# Patient Record
Sex: MALE | Race: ASIAN | Employment: FULL TIME | URBAN - METROPOLITAN AREA
[De-identification: names, ages, dates, MRNs, and addresses within clinical notes are randomized per-mention and may not be internally consistent; named-entity substitution may affect disease eponyms.]

---

## 2018-06-15 ENCOUNTER — NON-PROVIDER VISIT (OUTPATIENT)
Dept: URGENT CARE | Facility: CLINIC | Age: 43
End: 2018-06-15

## 2018-06-15 DIAGNOSIS — Z02.1 PRE-EMPLOYMENT DRUG SCREENING: ICD-10-CM

## 2018-06-15 LAB
AMP AMPHETAMINE: NORMAL
COC COCAINE: NORMAL
INT CON NEG: NORMAL
INT CON POS: NORMAL
MET METHAMPHETAMINES: NORMAL
OPI OPIATES: NORMAL
PCP PHENCYCLIDINE: NORMAL
POC DRUG COMMENT 753798-OCCUPATIONAL HEALTH: NEGATIVE
THC: NORMAL

## 2018-06-15 PROCEDURE — 80305 DRUG TEST PRSMV DIR OPT OBS: CPT | Performed by: NURSE PRACTITIONER

## 2022-12-13 ENCOUNTER — TELEPHONE (OUTPATIENT)
Dept: SCHEDULING | Facility: IMAGING CENTER | Age: 47
End: 2022-12-13

## 2022-12-18 SDOH — ECONOMIC STABILITY: INCOME INSECURITY: IN THE LAST 12 MONTHS, WAS THERE A TIME WHEN YOU WERE NOT ABLE TO PAY THE MORTGAGE OR RENT ON TIME?: NO

## 2022-12-18 SDOH — ECONOMIC STABILITY: FOOD INSECURITY: WITHIN THE PAST 12 MONTHS, YOU WORRIED THAT YOUR FOOD WOULD RUN OUT BEFORE YOU GOT MONEY TO BUY MORE.: NEVER TRUE

## 2022-12-18 SDOH — ECONOMIC STABILITY: TRANSPORTATION INSECURITY
IN THE PAST 12 MONTHS, HAS LACK OF RELIABLE TRANSPORTATION KEPT YOU FROM MEDICAL APPOINTMENTS, MEETINGS, WORK OR FROM GETTING THINGS NEEDED FOR DAILY LIVING?: NO

## 2022-12-18 SDOH — ECONOMIC STABILITY: INCOME INSECURITY: HOW HARD IS IT FOR YOU TO PAY FOR THE VERY BASICS LIKE FOOD, HOUSING, MEDICAL CARE, AND HEATING?: NOT VERY HARD

## 2022-12-18 SDOH — ECONOMIC STABILITY: FOOD INSECURITY: WITHIN THE PAST 12 MONTHS, THE FOOD YOU BOUGHT JUST DIDN'T LAST AND YOU DIDN'T HAVE MONEY TO GET MORE.: NEVER TRUE

## 2022-12-18 SDOH — ECONOMIC STABILITY: HOUSING INSECURITY
IN THE LAST 12 MONTHS, WAS THERE A TIME WHEN YOU DID NOT HAVE A STEADY PLACE TO SLEEP OR SLEPT IN A SHELTER (INCLUDING NOW)?: NO

## 2022-12-18 SDOH — HEALTH STABILITY: PHYSICAL HEALTH

## 2022-12-18 SDOH — ECONOMIC STABILITY: TRANSPORTATION INSECURITY
IN THE PAST 12 MONTHS, HAS THE LACK OF TRANSPORTATION KEPT YOU FROM MEDICAL APPOINTMENTS OR FROM GETTING MEDICATIONS?: NO

## 2022-12-18 SDOH — HEALTH STABILITY: MENTAL HEALTH

## 2022-12-18 SDOH — ECONOMIC STABILITY: TRANSPORTATION INSECURITY
IN THE PAST 12 MONTHS, HAS LACK OF TRANSPORTATION KEPT YOU FROM MEETINGS, WORK, OR FROM GETTING THINGS NEEDED FOR DAILY LIVING?: NO

## 2022-12-18 SDOH — ECONOMIC STABILITY: HOUSING INSECURITY: IN THE LAST 12 MONTHS, HOW MANY PLACES HAVE YOU LIVED?: 2

## 2022-12-18 ASSESSMENT — SOCIAL DETERMINANTS OF HEALTH (SDOH)
HOW OFTEN DO YOU ATTENT MEETINGS OF THE CLUB OR ORGANIZATION YOU BELONG TO?: 1 TO 4 TIMES PER YEAR
HOW OFTEN DO YOU GET TOGETHER WITH FRIENDS OR RELATIVES?: ONCE A WEEK
HOW OFTEN DO YOU GET TOGETHER WITH FRIENDS OR RELATIVES?: ONCE A WEEK
HOW HARD IS IT FOR YOU TO PAY FOR THE VERY BASICS LIKE FOOD, HOUSING, MEDICAL CARE, AND HEATING?: NOT VERY HARD
HOW OFTEN DO YOU HAVE A DRINK CONTAINING ALCOHOL: 4 OR MORE TIMES A WEEK
DO YOU BELONG TO ANY CLUBS OR ORGANIZATIONS SUCH AS CHURCH GROUPS UNIONS, FRATERNAL OR ATHLETIC GROUPS, OR SCHOOL GROUPS?: YES
HOW OFTEN DO YOU HAVE SIX OR MORE DRINKS ON ONE OCCASION: WEEKLY
WITHIN THE PAST 12 MONTHS, YOU WORRIED THAT YOUR FOOD WOULD RUN OUT BEFORE YOU GOT THE MONEY TO BUY MORE: NEVER TRUE
DO YOU BELONG TO ANY CLUBS OR ORGANIZATIONS SUCH AS CHURCH GROUPS UNIONS, FRATERNAL OR ATHLETIC GROUPS, OR SCHOOL GROUPS?: YES
IN A TYPICAL WEEK, HOW MANY TIMES DO YOU TALK ON THE PHONE WITH FAMILY, FRIENDS, OR NEIGHBORS?: MORE THAN THREE TIMES A WEEK
HOW OFTEN DO YOU ATTENT MEETINGS OF THE CLUB OR ORGANIZATION YOU BELONG TO?: 1 TO 4 TIMES PER YEAR
HOW OFTEN DO YOU ATTEND CHURCH OR RELIGIOUS SERVICES?: 1 TO 4 TIMES PER YEAR
HOW OFTEN DO YOU ATTEND CHURCH OR RELIGIOUS SERVICES?: 1 TO 4 TIMES PER YEAR
HOW MANY DRINKS CONTAINING ALCOHOL DO YOU HAVE ON A TYPICAL DAY WHEN YOU ARE DRINKING: 3 OR 4
IN A TYPICAL WEEK, HOW MANY TIMES DO YOU TALK ON THE PHONE WITH FAMILY, FRIENDS, OR NEIGHBORS?: MORE THAN THREE TIMES A WEEK

## 2022-12-18 ASSESSMENT — LIFESTYLE VARIABLES
HOW MANY STANDARD DRINKS CONTAINING ALCOHOL DO YOU HAVE ON A TYPICAL DAY: 3 OR 4
SKIP TO QUESTIONS 9-10: 0
HOW OFTEN DO YOU HAVE SIX OR MORE DRINKS ON ONE OCCASION: WEEKLY
AUDIT-C TOTAL SCORE: 8
HOW OFTEN DO YOU HAVE A DRINK CONTAINING ALCOHOL: 4 OR MORE TIMES A WEEK

## 2022-12-21 ENCOUNTER — OFFICE VISIT (OUTPATIENT)
Dept: MEDICAL GROUP | Facility: PHYSICIAN GROUP | Age: 47
End: 2022-12-21
Payer: COMMERCIAL

## 2022-12-21 VITALS
OXYGEN SATURATION: 98 % | SYSTOLIC BLOOD PRESSURE: 122 MMHG | HEIGHT: 67 IN | RESPIRATION RATE: 18 BRPM | TEMPERATURE: 98.4 F | BODY MASS INDEX: 26.21 KG/M2 | WEIGHT: 167 LBS | DIASTOLIC BLOOD PRESSURE: 68 MMHG | HEART RATE: 107 BPM

## 2022-12-21 DIAGNOSIS — F17.210 NICOTINE DEPENDENCE, CIGARETTES, UNCOMPLICATED: ICD-10-CM

## 2022-12-21 DIAGNOSIS — E78.5 DYSLIPIDEMIA: ICD-10-CM

## 2022-12-21 DIAGNOSIS — E11.9 TYPE 2 DIABETES MELLITUS WITHOUT COMPLICATION, WITHOUT LONG-TERM CURRENT USE OF INSULIN (HCC): ICD-10-CM

## 2022-12-21 DIAGNOSIS — Z12.11 COLON CANCER SCREENING: ICD-10-CM

## 2022-12-21 DIAGNOSIS — R21 RASH AND NONSPECIFIC SKIN ERUPTION: ICD-10-CM

## 2022-12-21 DIAGNOSIS — I10 PRIMARY HYPERTENSION: ICD-10-CM

## 2022-12-21 DIAGNOSIS — Z76.89 ENCOUNTER TO ESTABLISH CARE: ICD-10-CM

## 2022-12-21 DIAGNOSIS — Z23 NEED FOR VACCINATION: ICD-10-CM

## 2022-12-21 DIAGNOSIS — M1A.9XX0 CHRONIC GOUT WITHOUT TOPHUS, UNSPECIFIED CAUSE, UNSPECIFIED SITE: ICD-10-CM

## 2022-12-21 PROBLEM — M10.9 GOUT: Status: ACTIVE | Noted: 2022-12-21

## 2022-12-21 PROCEDURE — 99204 OFFICE O/P NEW MOD 45 MIN: CPT | Mod: 25 | Performed by: STUDENT IN AN ORGANIZED HEALTH CARE EDUCATION/TRAINING PROGRAM

## 2022-12-21 PROCEDURE — 90686 IIV4 VACC NO PRSV 0.5 ML IM: CPT | Performed by: STUDENT IN AN ORGANIZED HEALTH CARE EDUCATION/TRAINING PROGRAM

## 2022-12-21 PROCEDURE — 90471 IMMUNIZATION ADMIN: CPT | Performed by: STUDENT IN AN ORGANIZED HEALTH CARE EDUCATION/TRAINING PROGRAM

## 2022-12-21 PROCEDURE — 99406 BEHAV CHNG SMOKING 3-10 MIN: CPT | Performed by: STUDENT IN AN ORGANIZED HEALTH CARE EDUCATION/TRAINING PROGRAM

## 2022-12-21 RX ORDER — TRIAMCINOLONE ACETONIDE 1 MG/G
1 CREAM TOPICAL 2 TIMES DAILY
Qty: 15 G | Refills: 1 | Status: SHIPPED | OUTPATIENT
Start: 2022-12-21 | End: 2023-05-26

## 2022-12-21 ASSESSMENT — PATIENT HEALTH QUESTIONNAIRE - PHQ9: CLINICAL INTERPRETATION OF PHQ2 SCORE: 0

## 2022-12-21 NOTE — ASSESSMENT & PLAN NOTE
/82, repeat /68 today.  Patient was previously on Lisinopril 20 mg daily and last took medication over 2 years ago.

## 2022-12-21 NOTE — PROGRESS NOTES
Subjective:     CC:  establish care    HISTORY OF THE PRESENT ILLNESS: Patient is a 47 y.o. male here today to establish care.    Gout  History of gout with last flare 5 years ago. Previously taking Indomethacin 50 mg as needed.    Hypertension  /82, repeat /68 today.  Patient was previously on Lisinopril 20 mg daily and last took medication over 2 years ago.    Dyslipidemia  Patient was previously on Atorvastatin 20 mg daily and last took medication over 2 years ago.    Type 2 diabetes mellitus without complication (HCC)  Patient was previously on Metformin 500 mg daily and last took medication over 2 years ago.    Nicotine dependence, cigarettes, uncomplicated  Patient reports he has been smoking since he was in college.  Patient reports currently smoking 0.5 ppd and was smoking less in the past.  Patient is not ready to quit at this time.    Rash and nonspecific skin eruption  Patient complains of an itchy rash on his right ankle that has been present for the past 2 years.  Patient reports he has been applying Vaseline but there has been no improvement and the rash has not changed in size.    Health Maintenance: Completed    Allergies   Allergen Reactions    Farxiga [Dapagliflozin] Hives     Patient Active Problem List   Diagnosis    Type 2 diabetes mellitus without complication (HCC)    Dyslipidemia    Gout    Hypertension     Current Outpatient Medications   Medication Sig Dispense Refill    oxycodone-acetaminophen (PERCOCET) 5-325 MG TABS Take 1 Tab by mouth every four hours as needed for Mild Pain. pain (Patient not taking: Reported on 12/21/2022) 10 Each 0     No current facility-administered medications for this visit.     History reviewed. No pertinent surgical history.   Social History     Socioeconomic History    Marital status:      Spouse name: Not on file    Number of children: 3    Years of education: Not on file    Highest education level: Some college, no degree   Occupational  History    Occupation: HARDWARE PROD SPEC     Employer: [employer]   Tobacco Use    Smoking status: Every Day     Packs/day: 0.50     Years: 25.00     Pack years: 12.50     Types: Cigarettes    Smokeless tobacco: Never   Vaping Use    Vaping Use: Never used   Substance and Sexual Activity    Alcohol use: Not Currently     Comment: 2-3 beers daily    Drug use: Never    Sexual activity: Yes     Partners: Female     Comment:    Other Topics Concern    Not on file   Social History Narrative    Lives with wife and daughter, 2 dogs. 2 sons that have moved out.     Social Determinants of Health     Financial Resource Strain: Low Risk     Difficulty of Paying Living Expenses: Not very hard   Food Insecurity: No Food Insecurity    Worried About Running Out of Food in the Last Year: Never true    Ran Out of Food in the Last Year: Never true   Transportation Needs: No Transportation Needs    Lack of Transportation (Medical): No    Lack of Transportation (Non-Medical): No   Physical Activity: Not on file   Stress: Not on file   Social Connections: Socially Integrated    Frequency of Communication with Friends and Family: More than three times a week    Frequency of Social Gatherings with Friends and Family: Once a week    Attends Religion Services: 1 to 4 times per year    Active Member of Clubs or Organizations: Yes    Attends Club or Organization Meetings: 1 to 4 times per year    Marital Status:    Intimate Partner Violence: Not on file   Housing Stability: Low Risk     Unable to Pay for Housing in the Last Year: No    Number of Places Lived in the Last Year: 2    Unstable Housing in the Last Year: No     Family History   Problem Relation Age of Onset    Diabetes Maternal Grandmother          ROS:     Constitutional:  Negative for chills, fever, fatigue, weight loss.  HEENT:  Negative for blurred vision, hearing loss, sore throat.    Respiratory:  Negative for cough, sputum production  "and shortness of breath.  Cardiovascular:  Negative for chest pain, palpitations and leg swelling.  Gastrointestinal:  Negative for abdominal pain, blood in stool, constipation, diarrhea and vomiting.   Musculoskeletal:  Negative for back pain, falls, joint pain and neck pain.   Skin: Positive for rash.   Neurological:  Negative for dizziness, seizures, weakness and headaches.   Endo/Heme/Allergies:  Does not bruise/bleed easily.   Psychiatric/Behavioral:  Negative for depression, anxiety and suicidal thoughts.      Objective:     Exam: /82   Pulse (!) 107   Temp 36.9 °C (98.4 °F)   Resp 18   Ht 1.702 m (5' 7\")   Wt 75.8 kg (167 lb)   SpO2 98%  Body mass index is 26.16 kg/m².    Gen: Alert and oriented, no acute distress.  Eyes:  PERRL, conjunctivae clear, lids normal.   Neck: Neck is supple, trachea middle, no palpable lymphadenopathy or thyromegaly.  Lungs: Normal effort, CTAB, no wheezing / rhonchi / rales.  CV: RRR, normal S1 and S2, no murmurs.  GI:  Abdomen soft, non-tender, non-distended with normal bowel sounds.  MSK:  Normal ROM.  Ext: No clubbing, cyanosis, or edema.  Skin:  Dry, scaly, hyperpigmented rash on right ankle.  Neuro: AAO x 3, no acute focal deficits.  Psych: Normal affect and mood.      Assessment & Plan:   47 y.o. male with the following -    1. Encounter to establish care  Patient presents today to establish care.  Routine labs ordered.  - CBC WITHOUT DIFFERENTIAL; Future  - Comp Metabolic Panel; Future    2. Type 2 diabetes mellitus without complication, without long-term current use of insulin (HCC)  Chronic.  Patient has been off medication for the past 2 years and was previously on Metformin 500 mg daily.  Dec 2019 A1c 7.8.  Repeat labs ordered and start medication at the next visit.  - HEMOGLOBIN A1C; Future  - MICROALBUMIN CREAT RATIO URINE; Future    3. Dyslipidemia  Chronic.  Patient has been off medication for the past 2 years and was previously on Atorvastatin 20 mg " daily.  Repeat labs ordered and start medication at the next visit.  - Lipid Profile; Future    4. Chronic gout without tophus, unspecified cause, unspecified site  Chronic.  Patient denies recent flare.  Uric acid level ordered.  - URIC ACID; Future    5. Primary hypertension  Chronic.  /82, repeat /68 today.  BP is at goal and I will hold off on medication at this time.  Continue to monitor.    6. Rash and nonspecific skin eruption  Chronic.  Present for the past 2 years.  Prescribed topical triamcinolone.  - triamcinolone acetonide (KENALOG) 0.1 % Cream; Apply 1 Application topically 2 times a day.  Dispense: 15 g; Refill: 1    7. Nicotine dependence, cigarettes, uncomplicated  Chronic.  Time spent counseling: 3 min. Discussed risks associated with smoking but patient is not ready to quit at this time.    8. Colon cancer screening  - COLOGUARD COLON CANCER SCREENING    9. Need for vaccination  - Influenza Vaccine Quad Injection (PF)          Return in about 2 weeks (around 1/4/2023) for Discuss labs.    Please note that this dictation was created using voice recognition software. I have made every reasonable attempt to correct obvious errors, but I expect that there are errors of grammar and possibly content that I did not discover before finalizing the note.

## 2023-01-06 LAB
ALBUMIN SERPL-MCNC: 3.9 G/DL (ref 4–5)
ALBUMIN/CREAT UR: 22 MG/G CREAT (ref 0–29)
ALBUMIN/GLOB SERPL: 1.6 {RATIO} (ref 1.2–2.2)
ALP SERPL-CCNC: 85 IU/L (ref 44–121)
ALT SERPL-CCNC: 46 IU/L (ref 0–44)
AST SERPL-CCNC: 47 IU/L (ref 0–40)
BILIRUB SERPL-MCNC: 0.8 MG/DL (ref 0–1.2)
BUN SERPL-MCNC: 5 MG/DL (ref 6–24)
BUN/CREAT SERPL: 7 (ref 9–20)
CALCIUM SERPL-MCNC: 8.3 MG/DL (ref 8.7–10.2)
CHLORIDE SERPL-SCNC: 97 MMOL/L (ref 96–106)
CHOLEST SERPL-MCNC: 269 MG/DL (ref 100–199)
CO2 SERPL-SCNC: 21 MMOL/L (ref 20–29)
CREAT SERPL-MCNC: 0.7 MG/DL (ref 0.76–1.27)
CREAT UR-MCNC: 101.8 MG/DL
EGFRCR SERPLBLD CKD-EPI 2021: 113 ML/MIN/1.73
ERYTHROCYTE [DISTWIDTH] IN BLOOD BY AUTOMATED COUNT: 15.7 % (ref 11.6–15.4)
GLOBULIN SER CALC-MCNC: 2.5 G/DL (ref 1.5–4.5)
GLUCOSE SERPL-MCNC: 283 MG/DL (ref 70–99)
HBA1C MFR BLD: 11.9 % (ref 4.8–5.6)
HCT VFR BLD AUTO: 42.5 % (ref 37.5–51)
HDLC SERPL-MCNC: 33 MG/DL
HGB BLD-MCNC: 13.6 G/DL (ref 13–17.7)
LABORATORY COMMENT REPORT: ABNORMAL
LDLC SERPL CALC-MCNC: ABNORMAL MG/DL (ref 0–99)
MCH RBC QN AUTO: 25.7 PG (ref 26.6–33)
MCHC RBC AUTO-ENTMCNC: 32 G/DL (ref 31.5–35.7)
MCV RBC AUTO: 80 FL (ref 79–97)
MICROALBUMIN UR-MCNC: 22.5 UG/ML
NRBC BLD AUTO-RTO: ABNORMAL %
PLATELET # BLD AUTO: 143 X10E3/UL (ref 150–450)
POTASSIUM SERPL-SCNC: 3.7 MMOL/L (ref 3.5–5.2)
PROT SERPL-MCNC: 6.4 G/DL (ref 6–8.5)
RBC # BLD AUTO: 5.3 X10E6/UL (ref 4.14–5.8)
SODIUM SERPL-SCNC: 135 MMOL/L (ref 134–144)
TRIGL SERPL-MCNC: 1338 MG/DL (ref 0–149)
URATE SERPL-MCNC: 5.8 MG/DL (ref 3.8–8.4)
VLDLC SERPL CALC-MCNC: ABNORMAL MG/DL (ref 5–40)
WBC # BLD AUTO: 4.5 X10E3/UL (ref 3.4–10.8)

## 2023-01-12 ENCOUNTER — OFFICE VISIT (OUTPATIENT)
Dept: MEDICAL GROUP | Facility: PHYSICIAN GROUP | Age: 48
End: 2023-01-12
Payer: COMMERCIAL

## 2023-01-12 VITALS
HEART RATE: 86 BPM | BODY MASS INDEX: 23.86 KG/M2 | TEMPERATURE: 98.5 F | HEIGHT: 67 IN | SYSTOLIC BLOOD PRESSURE: 120 MMHG | RESPIRATION RATE: 13 BRPM | DIASTOLIC BLOOD PRESSURE: 78 MMHG | WEIGHT: 152 LBS | OXYGEN SATURATION: 99 %

## 2023-01-12 DIAGNOSIS — M1A.9XX0 CHRONIC GOUT WITHOUT TOPHUS, UNSPECIFIED CAUSE, UNSPECIFIED SITE: ICD-10-CM

## 2023-01-12 DIAGNOSIS — R79.89 ELEVATED LFTS: ICD-10-CM

## 2023-01-12 DIAGNOSIS — E78.1 HYPERTRIGLYCERIDEMIA: ICD-10-CM

## 2023-01-12 DIAGNOSIS — E78.5 DYSLIPIDEMIA: ICD-10-CM

## 2023-01-12 DIAGNOSIS — R21 RASH AND NONSPECIFIC SKIN ERUPTION: ICD-10-CM

## 2023-01-12 DIAGNOSIS — E11.65 UNCONTROLLED TYPE 2 DIABETES MELLITUS WITH HYPERGLYCEMIA (HCC): ICD-10-CM

## 2023-01-12 DIAGNOSIS — I10 PRIMARY HYPERTENSION: ICD-10-CM

## 2023-01-12 PROCEDURE — 99214 OFFICE O/P EST MOD 30 MIN: CPT | Performed by: STUDENT IN AN ORGANIZED HEALTH CARE EDUCATION/TRAINING PROGRAM

## 2023-01-12 RX ORDER — ATORVASTATIN CALCIUM 20 MG/1
20 TABLET, FILM COATED ORAL NIGHTLY
Qty: 90 TABLET | Refills: 1 | Status: SHIPPED | OUTPATIENT
Start: 2023-01-12 | End: 2023-04-12 | Stop reason: SDUPTHER

## 2023-01-12 RX ORDER — FENOFIBRATE 145 MG/1
145 TABLET, COATED ORAL DAILY
Qty: 90 TABLET | Refills: 1 | Status: SHIPPED | OUTPATIENT
Start: 2023-01-12 | End: 2023-04-12 | Stop reason: SDUPTHER

## 2023-01-12 ASSESSMENT — FIBROSIS 4 INDEX: FIB4 SCORE: 2.37

## 2023-01-12 ASSESSMENT — PATIENT HEALTH QUESTIONNAIRE - PHQ9: CLINICAL INTERPRETATION OF PHQ2 SCORE: 0

## 2023-01-13 PROBLEM — R79.89 ELEVATED LFTS: Status: ACTIVE | Noted: 2023-01-13

## 2023-01-13 NOTE — ASSESSMENT & PLAN NOTE
Current meds: None (previously on metformin 500 mg daily but last took medication 2 years ago)  Last A1c: 11.9 (1/4/23)  Last Microalbumin/Cr ratio: 22 (1/4/23),   Fasting sugars: N/A  Last diabetic foot exam: will be done at the next visit  Last retinal eye exam: will be done at the next visit

## 2023-01-17 DIAGNOSIS — E11.65 UNCONTROLLED TYPE 2 DIABETES MELLITUS WITH HYPERGLYCEMIA (HCC): ICD-10-CM

## 2023-01-17 RX ORDER — LANCETS 30 GAUGE
EACH MISCELLANEOUS
Qty: 100 EACH | Refills: 1 | Status: SHIPPED | OUTPATIENT
Start: 2023-01-17 | End: 2023-02-06

## 2023-01-31 ENCOUNTER — NON-PROVIDER VISIT (OUTPATIENT)
Dept: MEDICAL GROUP | Facility: PHYSICIAN GROUP | Age: 48
End: 2023-01-31
Payer: COMMERCIAL

## 2023-01-31 PROCEDURE — 99403 PREV MED CNSL INDIV APPRX 45: CPT | Performed by: PHARMACIST

## 2023-01-31 RX ORDER — BLOOD SUGAR DIAGNOSTIC
STRIP MISCELLANEOUS
COMMUNITY
Start: 2023-01-30 | End: 2023-02-06

## 2023-01-31 RX ORDER — DULAGLUTIDE 0.75 MG/.5ML
1 INJECTION, SOLUTION SUBCUTANEOUS
Qty: 1.96 ML | Refills: 0 | Status: SHIPPED | OUTPATIENT
Start: 2023-01-31 | End: 2023-03-01

## 2023-01-31 NOTE — PROGRESS NOTES
Patient Consult Note - Initial Visit    TIME IN: 3:49pm  TIME OUT: 4:29pm    Primary care physician: Shahana Nuñez M.D.    Reason for consult: Management of Uncontrolled Type 2 Diabetes    HPI:  Yoni Jose is a 47 y.o. old patient who comes in today for evaluation of above stated problem.    Most Recent HbA1c:   Lab Results   Component Value Date/Time    HBA1C 11.9 (H) 01/04/2023 08:15 AM    HBA1C 10.2 (H) 08/18/2015 09:17 AM      Lab Results   Component Value Date/Time    CREATININE 0.70 (L) 01/04/2023 08:15 AM    CREATININE 0.92 08/18/2015 09:17 AM              Diabetes Medication History and Current Regimen  Metformin: 500mg QD or BID    Previously attempted medications  Metformin, stopped several years ago    Pt has home glucometer and proper testing technique - Yes, but did not bring log today    Pt reports blood sugars:   Before Breakfast:   Before Lunch:   Before Dinner:   Before Bedtime:   Other times: pt states he has been testing ~30min PP and it has been high 200's to low 300's    Hypoglycemia awareness - Yes  Nocturnal hypoglycemia- None  Hypoglycemia:  None    Pt's treatment of Hypoglycemia - 15:15 Rule    Current Exercise - None  Exercise Goal - Had good exercise habits previously, gave away home gym equipment couple years back.  Discussed need for consistent exercise routine.  Encouraged him to start with couple days per week of walking and slowly progress up to goal of 150 min/week moderate intensity exercise.    Dietary - Admits to cultural influence on diet where rice is incorporated to most meals.  He has begun to cut down on this though, especially in the evening hours.    Pt reports eating:  Breakfast - Whole grain bread with PB, eggs.  Dinner - lean proteins (chicken, fish, pork, etc) along with veggies and minimal or no rice.  Snack - no desserts  Beverages - Diet soda on occasion, otherwise water.  Admits to daily alcohol intake, discussed keeping in moderation or eliminating to  better control diabetes.    Foot Exam:  Monofilament exam - will be done at visit with PCP.    Preventative Management  BP regimen (ACE/ARB) - none  ASA - none  Statin - Atorvastatin 20mg QD  Last Retinal Scan - regular f/u with optometry  Last Foot Exam - unknown  Last A1c -   Lab Results   Component Value Date/Time    HBA1C 11.9 (H) 01/04/2023 08:15 AM    HBA1C 10.2 (H) 08/18/2015 09:17 AM      Last Microalbuminuria - 1/2023     Latest Reference Range & Units 01/04/23 08:15   Creatinine, Random Urine Not Estab. mg/dL 101.8   Microalbumin, Urine Random Not Estab. ug/mL 22.5   Microalbumin-Creatinine 0 - 29 mg/g creat 22       Lipid Panel   Latest Reference Range & Units 01/04/23 08:15   Cholesterol,Tot 100 - 199 mg/dL 269 (H)   Triglycerides 0 - 149 mg/dL 1,338 (HH)   HDL >39 mg/dL 33 (L)   LDL Chol Calc (NIH) 0 - 99 mg/dL Comment !   VLDL Cholesterol Calc 5 - 40 mg/dL Comment !     At risk for pancreatitis, has started statin and fenofibrate since labs done.  Discussed with patient risk of pancreatitis and need to decrease alcohol and carb intake for better control.      Past Medical History:  Patient Active Problem List    Diagnosis Date Noted    Elevated LFTs 01/13/2023    Hypertriglyceridemia 01/12/2023    Uncontrolled diabetes mellitus with hyperglycemia (HCC) 12/21/2022    Dyslipidemia 12/21/2022    Gout 12/21/2022    Hypertension 12/21/2022    Nicotine dependence, cigarettes, uncomplicated 12/21/2022    Rash and nonspecific skin eruption 12/21/2022       Past Surgical History:  No past surgical history on file.    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 2 sons that have moved out.     Social Determinants of Health     Financial Resource Strain: Low Risk     Difficulty of Paying Living Expenses: Not very hard   Food Insecurity: No Food Insecurity    Worried About Running Out of Food in the Last Year: Never true    Ran Out of Food in the Last Year: Never true   Transportation Needs: No Transportation Needs    Lack of Transportation (Medical): No    Lack of Transportation (Non-Medical): No   Physical Activity: Not on file   Stress: Not on file   Social Connections: Socially Integrated    Frequency of Communication with Friends and Family: More than three times a week    Frequency of Social Gatherings with Friends and Family: Once a week    Attends Hinduism Services: 1 to 4 times per year    Active Member of Clubs or Organizations: Yes    Attends Club or Organization Meetings: 1 to 4 times per year    Marital Status:    Intimate Partner Violence: Not on file   Housing Stability: Low Risk     Unable to Pay for Housing in the Last Year: No    Number of Places Lived in the Last Year: 2    Unstable Housing in the Last Year: No       Family History:  Family History   Problem Relation Age of Onset    Diabetes Maternal Grandmother        Medications:    Current Outpatient Medications:     Blood Glucose Test Strips, Use with glucometer to check blood glucose levels 3-4x daily., Disp: 100 Strip, Rfl: 1    Lancets, Use with glucometer to check blood glucose levels 3-4x daily., Disp: 100 Each, Rfl: 1    atorvastatin (LIPITOR) 20 MG  Tab, Take 1 Tablet by mouth every evening., Disp: 90 Tablet, Rfl: 1    fenofibrate (TRICOR) 145 MG Tab, Take 1 Tablet by mouth every day., Disp: 90 Tablet, Rfl: 1    metFORMIN (GLUCOPHAGE) 500 MG Tab, Take 1 Tablet by mouth every day., Disp: 60 Tablet, Rfl: 2    triamcinolone acetonide (KENALOG) 0.1 % Cream, Apply 1 Application topically 2 times a day., Disp: 15 g, Rfl: 1    Labs: Reviewed    Physical Examination:  Vital signs: There were no vitals taken for this visit. There is no height or weight on file to calculate BMI.    Assessment and Plan:    1. DM2  Patient seen today for initial visit, referred by PCP.  Longstanding hx of diabetes, family hx unremarkable.  Had two year gap in medical cover d/t lack of insurance coverage and was off all medications.  Previous A1c's under much better control than most recent results.  Diabetes seems to be largely influenced by dietary habits.    Basic physiology of DMII was explained to patient as well as microvascular/macrovascular complications. The importance of increasing physical activity to improve diabetes control was discussed with the patient. Patient was also educated on changing diet and making better choices to help control blood sugar.  Discussed FBG goal of , 2hr PP <180, and A1c <7.0%.    Discussed current treatment modalities and rationale for use of GLP1a, SGLT2i, and GLP/GIPa.  Patient states that he had allergic reaction to Farxiga in the past, discussed trying Jardiance to determine if class effect, but decided on starting GLP1a today.    - Medication changes -   START Trulicity 0.75mg once weekly.  Continue all other medications for now.     - Lifestyle changes -   Diet:  Continue to minimize rice intake, recognize need to limit alcohol consumption.  Focus on lean proteins, whole foods, and adequate hydration.  Exercise:  As above, begin implementing routine exercise plan.    Follow Up:  Four weeks    Douglas Correia, BravoD, BCACP  01/31/23    CC:    Shahana Nuñez M.D.  Kleber Branch M.D.                                                   [facility] Consent      Name    Yoni Jose    MRN number: [id number]    the following are guidelines for participation in the [facility].     I, ____Yoni Jose_____, understand and voluntarily agree to participate in the [facility] and to have services provided to me by pharmacists working in collaboration with my provider.    I understand the pharmacist within the [facility] may initiate, modify or discontinue my medications, order appropriate testing and appointments, perform exams, monitor treatment, and make clinical evaluations and decisions pursuant to a collaborative practice agreement with my provider.  I understand the pharmacist within the [facility] is not a physician, osteopathic physician, advanced practice registered nurse or physician assistant and may not diagnose.  I will take all my medications as instructed and not change the way I take it without first talking to my provider or a pharmacist within the [facility].  I understand that if I am late to my appointment I may not be able to be seen by a pharmacist at that time and will have to reschedule my appointment.  During appointment with pharmacist I understand that pharmacist has the right not to answer questions or perform services outside the pharmacist’s scope of practice.  By signing below, I provide informed consent for the pharmacist to provide these services and for my participation in the [facility].      Yoni Jose           [id number]          01/31/23  Patient Name                   MRN number  Date     ___X_Obtained verbal consent from pt, No signature due to COVID concerns___   Patient Signature

## 2023-02-06 DIAGNOSIS — E11.65 UNCONTROLLED TYPE 2 DIABETES MELLITUS WITH HYPERGLYCEMIA (HCC): ICD-10-CM

## 2023-02-06 RX ORDER — LANCETS 30 GAUGE
EACH MISCELLANEOUS
Qty: 100 EACH | Refills: 1 | Status: SHIPPED | OUTPATIENT
Start: 2023-02-06

## 2023-02-06 RX ORDER — CALCIUM CITRATE/VITAMIN D3 200MG-6.25
TABLET ORAL
Qty: 100 STRIP | Refills: 1 | Status: SHIPPED | OUTPATIENT
Start: 2023-02-06 | End: 2023-04-03

## 2023-02-06 NOTE — PROGRESS NOTES
[employer] covered by insurance.  Glucometer, test strips, lancets ordered for uncontrolled type 2 DM.

## 2023-03-01 RX ORDER — DULAGLUTIDE 0.75 MG/.5ML
1 INJECTION, SOLUTION SUBCUTANEOUS
Qty: 2 ML | Refills: 1 | Status: SHIPPED | OUTPATIENT
Start: 2023-03-01 | End: 2023-03-07

## 2023-03-07 ENCOUNTER — NON-PROVIDER VISIT (OUTPATIENT)
Dept: MEDICAL GROUP | Facility: PHYSICIAN GROUP | Age: 48
End: 2023-03-07
Payer: COMMERCIAL

## 2023-03-07 PROCEDURE — 99401 PREV MED CNSL INDIV APPRX 15: CPT | Performed by: STUDENT IN AN ORGANIZED HEALTH CARE EDUCATION/TRAINING PROGRAM

## 2023-03-07 RX ORDER — EMPAGLIFLOZIN 10 MG/1
10 TABLET, FILM COATED ORAL DAILY
Qty: 14 TABLET | Refills: 0 | Status: SHIPPED | OUTPATIENT
Start: 2023-03-07 | End: 2023-03-21

## 2023-03-07 RX ORDER — DULAGLUTIDE 1.5 MG/.5ML
1 INJECTION, SOLUTION SUBCUTANEOUS
Qty: 2.24 ML | Refills: 6 | Status: SHIPPED | OUTPATIENT
Start: 2023-03-07 | End: 2023-05-26

## 2023-03-07 NOTE — PROGRESS NOTES
Patient Consult Note - Follow Up Visit  Primary care physician: Shahana Nuñez M.D.    Reason for consult: Management of Uncontrolled Type 2 Diabetes    Time IN:  8:58am  Time OUT:  9:16am    HPI:  Yoni Jose is a 47 y.o. old patient who comes in today for evaluation of above stated problem.    Most Recent HbA1c:   Lab Results   Component Value Date/Time    HBA1C 11.9 (H) 01/04/2023 08:15 AM    HBA1C 10.2 (H) 08/18/2015 09:17 AM        Current Diabetes Regimen:  GLP-1 Agent: Dulaglutide 0.75 mg once weekly   Metformin ER/IR 500mg BID      Before Breakfast:  108-157  Before Lunch:   Before Dinner:    Before Bedtime:  Other times:  Hypoglycemia:  None    Diet/Exercise:  Exercise Goal - Encouraged him to start with couple days per week of walking and slowly progress up to goal of 150 min/week moderate intensity exercise.     Dietary - Admits to cultural influence on diet where rice is incorporated to most meals.  He has begun to cut down on this though, especially in the evening hours.    Foot Exam:  Monofilament exam - will be done at visit with PCP.     Preventative Management  BP regimen (ACE/ARB) - none  ASA - none  Statin - Atorvastatin 20mg QD  Last Retinal Scan - regular f/u with optometry  Last Foot Exam - unknown  Last A1c -         Lab Results   Component Value Date/Time     HBA1C 11.9 (H) 01/04/2023 08:15 AM     HBA1C 10.2 (H) 08/18/2015 09:17 AM      Last Microalbuminuria - 1/2023       Latest Reference Range & Units 01/04/23 08:15   Creatinine, Random Urine Not Estab. mg/dL 101.8   Microalbumin, Urine Random Not Estab. ug/mL 22.5   Microalbumin-Creatinine 0 - 29 mg/g creat 22        Lipid Panel    Latest Reference Range & Units 01/04/23 08:15   Cholesterol,Tot 100 - 199 mg/dL 269 (H)   Triglycerides 0 - 149 mg/dL 1,338 (HH)   HDL >39 mg/dL 33 (L)   LDL Chol Calc ([facility]) 0 - 99 mg/dL Comment !   VLDL Cholesterol Calc 5 - 40 mg/dL Comment !      At risk for pancreatitis, has started  statin and fenofibrate since labs done.  Discussed with patient risk of pancreatitis and need to decrease alcohol and carb intake for better control.       ROS:  Constitutional: No weight loss  Cardiac: No palpitations or racing heart  Resp: No shortness of breath  Neuro: No numbness or tinging in feet  Endo: No heat or cold intolerance, no polyuria or polydipsia  All other systems were reviewed and were negative.    Past Medical History:  Patient Active Problem List    Diagnosis Date Noted    Elevated LFTs 01/13/2023    Hypertriglyceridemia 01/12/2023    Uncontrolled diabetes mellitus with hyperglycemia (HCC) 12/21/2022    Dyslipidemia 12/21/2022    Gout 12/21/2022    Hypertension 12/21/2022    Nicotine dependence, cigarettes, uncomplicated 12/21/2022    Rash and nonspecific skin eruption 12/21/2022       Past Surgical History:  No past surgical history on file.    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 2 sons that have moved out.     Social Determinants of Health     Financial Resource Strain: Low Risk     Difficulty of Paying Living Expenses: Not very hard   Food Insecurity: No Food Insecurity    Worried About Running Out of  Food in the Last Year: Never true    Ran Out of Food in the Last Year: Never true   Transportation Needs: No Transportation Needs    Lack of Transportation (Medical): No    Lack of Transportation (Non-Medical): No   Physical Activity: Not on file   Stress: Not on file   Social Connections: Socially Integrated    Frequency of Communication with Friends and Family: More than three times a week    Frequency of Social Gatherings with Friends and Family: Once a week    Attends Restorationism Services: 1 to 4 times per year    Active Member of Clubs or Organizations: Yes    Attends Club or Organization Meetings: 1 to 4 times per year    Marital Status:    Intimate Partner Violence: Not on file   Housing Stability: Low Risk     Unable to Pay for Housing in the Last Year: No    Number of Places Lived in the Last Year: 2    Unstable Housing in the Last Year: No       Family History:  Family History   Problem Relation Age of Onset    Diabetes Maternal Grandmother        Medications:    Current Outpatient Medications:     Blood Glucose Monitoring Suppl (TRUE METRIX METER) w/Device Kit, Use as directed to check blood glucose levels 3-4 times daily., Disp: 1 Kit, Rfl: 0    glucose blood (TRUE METRIX BLOOD GLUCOSE TEST) strip, Check blood glucose levels 3-4 times daily., Disp: 100 Strip, Rfl: 1    Lancets, Lancets order: Lancets for True Metrix meter. Sig: use 3-4 times daily and as needed for symptoms of high or low sugar., Disp: 100 Each, Rfl: 1    TRULICITY 0.75 MG/0.5ML Solution Pen-injector, INJECT 1 PEN UNDER THE SKIN EVERY 7 DAYS., Disp: 2 mL, Rfl: 1    atorvastatin (LIPITOR) 20 MG Tab, Take 1 Tablet by mouth every evening., Disp: 90 Tablet, Rfl: 1    fenofibrate (TRICOR) 145 MG Tab, Take 1 Tablet by mouth every day., Disp: 90 Tablet, Rfl: 1    metFORMIN (GLUCOPHAGE) 500 MG Tab, Take 1 Tablet by mouth every day., Disp: 60 Tablet, Rfl: 2    triamcinolone acetonide (KENALOG) 0.1 % Cream, Apply 1 Application topically 2  times a day., Disp: 15 g, Rfl: 1    Labs: Reviewed    Physical Examination:  Vital signs: There were no vitals taken for this visit. There is no height or weight on file to calculate BMI.  General: No apparent distress, cooperative  Eyes: No scleral icterus or discharge  ENMT: Normal on external inspection of nose, lips, normal thyroid exam  Neck: No abnormal masses on inspection  Resp: Normal effort, clear to auscultation bilaterally   CVS: Regular rate and rhythm, S1 S2 normal, no murmur   Extremities: No edema  Abdomen: abdominal obesity present  Neuro: Alert and oriented  Skin: No rash  Psych: Normal mood and affect, intact memory and able to make informed decisions    Assessment and Plan:    Patient is on max tolerated dose of metformin.  Tolerating initial dosing of Trulicity.  Home blood glucose have improved some, but still having quite a bit of lability throughout the day and FBG.  Diet and exercise largely unchanged, but has begun to recognize dietary impacts on blood glucose and adjusting habits accordingly.  As per previous note, patient did have reaction to Farxiga in the past, but is open to trial of Jardiance to determine if class effect.    START Jardiance 10mg QD, gave coupon card for 14 day trial.  Instructed to stop ASAP if any severe reaction.  INCREASE Trulicity to 1.5mg SQ once weekly.  Continue all other medications for now.  Continue to be mindful on dietary habits, minimizing carbs and sweets, focus on lean meats and veggies.  Increase exercise.    Follow Up:  Six weeks.    Thank you for allowing me to participate in the care of this patient.    Douglas Correia, BravoD, BCACP  03/07/23    CC:   Shahana Nuñez M.D.

## 2023-03-14 DIAGNOSIS — E11.65 UNCONTROLLED TYPE 2 DIABETES MELLITUS WITH HYPERGLYCEMIA (HCC): ICD-10-CM

## 2023-03-21 RX ORDER — EMPAGLIFLOZIN 25 MG/1
1 TABLET, FILM COATED ORAL DAILY
Qty: 90 TABLET | Refills: 1 | Status: SHIPPED | OUTPATIENT
Start: 2023-03-21 | End: 2023-05-26

## 2023-04-02 DIAGNOSIS — E11.65 UNCONTROLLED TYPE 2 DIABETES MELLITUS WITH HYPERGLYCEMIA (HCC): ICD-10-CM

## 2023-04-03 RX ORDER — CALCIUM CITRATE/VITAMIN D3 200MG-6.25
TABLET ORAL
Qty: 100 STRIP | Refills: 0 | Status: SHIPPED | OUTPATIENT
Start: 2023-04-03 | End: 2023-05-26 | Stop reason: SDUPTHER

## 2023-04-03 NOTE — TELEPHONE ENCOUNTER
Requested Prescriptions     Pending Prescriptions Disp Refills   • TRUE METRIX BLOOD GLUCOSE TEST strip [Pharmacy Med Name: TRUE METRIX GLUCOSE TEST STRIP] 100 Strip 0     Sig: CHECK BLOOD GLUCOSE LEVELS 3-4 TIMES DAILY.       JAVIER Helms.

## 2023-04-12 DIAGNOSIS — E78.1 HYPERTRIGLYCERIDEMIA: ICD-10-CM

## 2023-04-12 DIAGNOSIS — E11.65 UNCONTROLLED TYPE 2 DIABETES MELLITUS WITH HYPERGLYCEMIA (HCC): ICD-10-CM

## 2023-04-12 RX ORDER — FENOFIBRATE 145 MG/1
145 TABLET, COATED ORAL DAILY
Qty: 90 TABLET | Refills: 3 | Status: SHIPPED | OUTPATIENT
Start: 2023-04-12 | End: 2023-07-10

## 2023-04-12 RX ORDER — ATORVASTATIN CALCIUM 20 MG/1
20 TABLET, FILM COATED ORAL NIGHTLY
Qty: 90 TABLET | Refills: 3 | Status: SHIPPED | OUTPATIENT
Start: 2023-04-12 | End: 2023-07-10

## 2023-04-12 RX ORDER — ATORVASTATIN CALCIUM 20 MG/1
20 TABLET, FILM COATED ORAL NIGHTLY
Qty: 90 TABLET | Refills: 0 | OUTPATIENT
Start: 2023-04-12

## 2023-04-12 RX ORDER — FENOFIBRATE 145 MG/1
145 TABLET, COATED ORAL DAILY
Qty: 90 TABLET | Refills: 0 | OUTPATIENT
Start: 2023-04-12

## 2023-04-12 NOTE — TELEPHONE ENCOUNTER
Received request via: Pharmacy    Was the patient seen in the last year in this department? Yes    Does the patient have an active prescription (recently filled or refills available) for medication(s) requested?  Fax sent from [facility] that patient is requesting his Fenofibrate, Atorvastatin and Metformin be filled for 90 day and 3 refills. I updated the pharmacy.     Does the patient have assisted Plus and need 100 day supply (blood pressure, diabetes and cholesterol meds only)? Patient does not have SCP

## 2023-04-18 ENCOUNTER — NON-PROVIDER VISIT (OUTPATIENT)
Dept: MEDICAL GROUP | Facility: PHYSICIAN GROUP | Age: 48
End: 2023-04-18
Payer: COMMERCIAL

## 2023-04-18 DIAGNOSIS — E11.65 UNCONTROLLED TYPE 2 DIABETES MELLITUS WITH HYPERGLYCEMIA (HCC): ICD-10-CM

## 2023-04-18 LAB
HBA1C MFR BLD: 7 % (ref ?–5.8)
POCT INT CON NEG: NEGATIVE
POCT INT CON POS: POSITIVE

## 2023-04-18 PROCEDURE — 99401 PREV MED CNSL INDIV APPRX 15: CPT | Performed by: NURSE PRACTITIONER

## 2023-04-18 PROCEDURE — 83036 HEMOGLOBIN GLYCOSYLATED A1C: CPT | Performed by: NURSE PRACTITIONER

## 2023-04-18 NOTE — PROGRESS NOTES
Patient Consult Note - Follow Up Visit  Primary care physician: Shahana Nuñez M.D.    Reason for consult: Management of Uncontrolled Type 2 Diabetes    Time IN:  9:01am  Time OUT:  9:20am    HPI:  Yoni Jose is a 47 y.o. old patient who comes in today for evaluation of above stated problem.    Most Recent HbA1c:   Lab Results   Component Value Date/Time    HBA1C 7.0 (A) 04/18/2023 09:04 AM        Current Diabetes Regimen:  GLP-1 Agent: Dulaglutide 1.5 mg once weekly - stopped several weeks ago d/t cost  SGLT-2 Inhibitor:  Empagliflozin 25 mg once daily - stopped several weeks ago d/t cost  Metformin ER/IR  500mg BID    Before Breakfast:  175, 146  After Lunch:  71, 170, 158, 82, 218, 114, 113  Before Dinner:  108, 100, 84, 105, 90, 107, 87, 99, 110, 99, 80, 82  14d avr 101  30d avr 115   Other times:  Hypoglycemia:  None    Diet/Exercise:  Continues to be mindful of dietary practices, keeping portions small and minimizing simple carbs.  Has started walking on regular basis.    Foot Exam:  Monofilament exam - will need updating at next visit.    Preventative Management  BP regimen (ACE/ARB) - none  ASA - none  Statin - Atorvastatin 20mg QD  Last Retinal Scan - regular f/u with optometry   Last Foot Exam - unknown  Last A1c -   Lab Results   Component Value Date/Time    HBA1C 7.0 (A) 04/18/2023 09:04 AM      Last Microalbuminuria - 1/2023       Latest Reference Range & Units 01/04/23 08:15   Creatinine, Random Urine Not Estab. mg/dL 101.8   Microalbumin, Urine Random Not Estab. ug/mL 22.5   Microalbumin-Creatinine 0 - 29 mg/g creat 22         Lipid Panel    Latest Reference Range & Units 01/04/23 08:15   Cholesterol,Tot 100 - 199 mg/dL 269 (H)   Triglycerides 0 - 149 mg/dL 1,338 (HH)   HDL >39 mg/dL 33 (L)   LDL Chol Calc ([facility]) 0 - 99 mg/dL Comment !   VLDL Cholesterol Calc 5 - 40 mg/dL Comment !      At risk for pancreatitis, has started statin and fenofibrate since labs done.  Discussed with patient risk  of pancreatitis and need to decrease alcohol and carb intake for better control.    ROS:  Constitutional: No weight loss  Cardiac: No palpitations or racing heart  Resp: No shortness of breath  Neuro: No numbness or tinging in feet  Endo: No heat or cold intolerance, no polyuria or polydipsia  All other systems were reviewed and were negative.    Past Medical History:  Patient Active Problem List    Diagnosis Date Noted    Elevated LFTs 01/13/2023    Hypertriglyceridemia 01/12/2023    Uncontrolled diabetes mellitus with hyperglycemia (HCC) 12/21/2022    Dyslipidemia 12/21/2022    Gout 12/21/2022    Hypertension 12/21/2022    Nicotine dependence, cigarettes, uncomplicated 12/21/2022    Rash and nonspecific skin eruption 12/21/2022       Past Surgical History:  No past surgical history on file.    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 2 sons that have moved out.     Social Determinants of Health     Financial Resource Strain: Low Risk     Difficulty of Paying Living Expenses: Not very hard   Food Insecurity: No Food Insecurity    Worried About Running Out of Food in the Last Year: Never true    Ran Out of Food in the Last Year:  Never true   Transportation Needs: No Transportation Needs    Lack of Transportation (Medical): No    Lack of Transportation (Non-Medical): No   Physical Activity: Not on file   Stress: Not on file   Social Connections: Socially Integrated    Frequency of Communication with Friends and Family: More than three times a week    Frequency of Social Gatherings with Friends and Family: Once a week    Attends Hindu Services: 1 to 4 times per year    Active Member of Clubs or Organizations: Yes    Attends Club or Organization Meetings: 1 to 4 times per year    Marital Status:    Intimate Partner Violence: Not on file   Housing Stability: Low Risk     Unable to Pay for Housing in the Last Year: No    Number of Places Lived in the Last Year: 2    Unstable Housing in the Last Year: No       Family History:  Family History   Problem Relation Age of Onset    Diabetes Maternal Grandmother        Medications:    Current Outpatient Medications:     atorvastatin (LIPITOR) 20 MG Tab, Take 1 Tablet by mouth every evening., Disp: 90 Tablet, Rfl: 3    fenofibrate (TRICOR) 145 MG Tab, Take 1 Tablet by mouth every day., Disp: 90 Tablet, Rfl: 3    metFORMIN (GLUCOPHAGE) 500 MG Tab, Take 1 Tablet by mouth every day., Disp: 90 Tablet, Rfl: 3    TRUE METRIX BLOOD GLUCOSE TEST strip, CHECK BLOOD GLUCOSE LEVELS 3-4 TIMES DAILY., Disp: 100 Strip, Rfl: 0    Empagliflozin (JARDIANCE) 25 MG Tab, Take 1 Tablet by mouth every day., Disp: 90 Tablet, Rfl: 1    Dulaglutide (TRULICITY) 1.5 MG/0.5ML Solution Pen-injector, Inject 1 PEN under the skin every 7 days., Disp: 2.24 mL, Rfl: 6    Blood Glucose Monitoring Suppl (TRUE METRIX METER) w/Device Kit, Use as directed to check blood glucose levels 3-4 times daily., Disp: 1 Kit, Rfl: 0    Lancets, Lancets order: Lancets for True Metrix meter. Sig: use 3-4 times daily and as needed for symptoms of high or low sugar., Disp: 100 Each, Rfl: 1    triamcinolone acetonide (KENALOG) 0.1 % Cream, Apply 1  "Application topically 2 times a day., Disp: 15 g, Rfl: 1    Labs: Reviewed    Physical Examination:  Vital signs: There were no vitals taken for this visit. There is no height or weight on file to calculate BMI.  General: No apparent distress, cooperative  Eyes: No scleral icterus or discharge  ENMT: Normal on external inspection of nose, lips, normal thyroid exam  Neck: No abnormal masses on inspection  Resp: Normal effort, clear to auscultation bilaterally   CVS: Regular rate and rhythm, S1 S2 normal, no murmur   Extremities: No edema  Abdomen: abdominal obesity present  Neuro: Alert and oriented  Skin: No rash  Psych: Normal mood and affect, intact memory and able to make informed decisions    Assessment and Plan:    Patient taking max tolerated dose of metformin.  Was forced to stop Trulicity and Jardiance secondary to extremely high copay/deductible.  A1c has shown great improvement, down from 11.4 to 7.0 today.  Home glucose readings have remained close to goal, even with Trulicity and Jardiance being stopped.  Diet continues to improve, is adjusting eating habits based on testing results.  Has started walking \"as much as possible\".    Will have patient continue with metformin monotherapy for now.  Discussed addition of glipizide, but will wait to see how home readings continue to run.  Continue to improve on dietary habits.  Increase exercise.      Follow Up:  With PCP in five weeks, our clinic in 12 weeks.    Thank you for allowing me to participate in the care of this patient.    Douglas Correia, PharmD, BCACP  04/18/23    CC:   Shahana Nuñez M.D.      "

## 2023-05-18 ENCOUNTER — HOSPITAL ENCOUNTER (OUTPATIENT)
Dept: LAB | Facility: MEDICAL CENTER | Age: 48
End: 2023-05-18
Attending: STUDENT IN AN ORGANIZED HEALTH CARE EDUCATION/TRAINING PROGRAM
Payer: COMMERCIAL

## 2023-05-18 DIAGNOSIS — E11.65 UNCONTROLLED TYPE 2 DIABETES MELLITUS WITH HYPERGLYCEMIA (HCC): ICD-10-CM

## 2023-05-18 DIAGNOSIS — E78.1 HYPERTRIGLYCERIDEMIA: ICD-10-CM

## 2023-05-18 DIAGNOSIS — R79.89 ELEVATED LFTS: ICD-10-CM

## 2023-05-18 LAB
ALBUMIN SERPL BCP-MCNC: 4.3 G/DL (ref 3.2–4.9)
ALP SERPL-CCNC: 44 U/L (ref 30–99)
ALT SERPL-CCNC: 11 U/L (ref 2–50)
AST SERPL-CCNC: 16 U/L (ref 12–45)
BILIRUB CONJ SERPL-MCNC: <0.2 MG/DL (ref 0.1–0.5)
BILIRUB INDIRECT SERPL-MCNC: NORMAL MG/DL (ref 0–1)
BILIRUB SERPL-MCNC: 0.3 MG/DL (ref 0.1–1.5)
CHOLEST SERPL-MCNC: 121 MG/DL (ref 100–199)
EST. AVERAGE GLUCOSE BLD GHB EST-MCNC: 154 MG/DL
FASTING STATUS PATIENT QL REPORTED: NORMAL
HBA1C MFR BLD: 7 % (ref 4–5.6)
HDLC SERPL-MCNC: 50 MG/DL
LDLC SERPL CALC-MCNC: 39 MG/DL
PROT SERPL-MCNC: 7 G/DL (ref 6–8.2)
TRIGL SERPL-MCNC: 159 MG/DL (ref 0–149)

## 2023-05-18 PROCEDURE — 80061 LIPID PANEL: CPT

## 2023-05-18 PROCEDURE — 83036 HEMOGLOBIN GLYCOSYLATED A1C: CPT

## 2023-05-18 PROCEDURE — 80076 HEPATIC FUNCTION PANEL: CPT

## 2023-05-18 PROCEDURE — 36415 COLL VENOUS BLD VENIPUNCTURE: CPT

## 2023-05-22 ENCOUNTER — APPOINTMENT (OUTPATIENT)
Dept: MEDICAL GROUP | Facility: PHYSICIAN GROUP | Age: 48
End: 2023-05-22
Payer: COMMERCIAL

## 2023-05-26 ENCOUNTER — OFFICE VISIT (OUTPATIENT)
Dept: MEDICAL GROUP | Facility: PHYSICIAN GROUP | Age: 48
End: 2023-05-26
Payer: COMMERCIAL

## 2023-05-26 VITALS
RESPIRATION RATE: 16 BRPM | HEIGHT: 67 IN | WEIGHT: 146 LBS | DIASTOLIC BLOOD PRESSURE: 76 MMHG | SYSTOLIC BLOOD PRESSURE: 124 MMHG | OXYGEN SATURATION: 100 % | BODY MASS INDEX: 22.91 KG/M2 | HEART RATE: 93 BPM | TEMPERATURE: 97.6 F

## 2023-05-26 DIAGNOSIS — E78.5 DYSLIPIDEMIA: ICD-10-CM

## 2023-05-26 DIAGNOSIS — E11.9 TYPE 2 DIABETES MELLITUS WITHOUT COMPLICATION, WITHOUT LONG-TERM CURRENT USE OF INSULIN (HCC): ICD-10-CM

## 2023-05-26 DIAGNOSIS — E78.1 HYPERTRIGLYCERIDEMIA: ICD-10-CM

## 2023-05-26 DIAGNOSIS — Z23 NEED FOR VACCINATION: ICD-10-CM

## 2023-05-26 PROBLEM — R79.89 ELEVATED LFTS: Status: RESOLVED | Noted: 2023-01-13 | Resolved: 2023-05-26

## 2023-05-26 PROCEDURE — 3074F SYST BP LT 130 MM HG: CPT | Performed by: STUDENT IN AN ORGANIZED HEALTH CARE EDUCATION/TRAINING PROGRAM

## 2023-05-26 PROCEDURE — 90471 IMMUNIZATION ADMIN: CPT | Performed by: STUDENT IN AN ORGANIZED HEALTH CARE EDUCATION/TRAINING PROGRAM

## 2023-05-26 PROCEDURE — 90746 HEPB VACCINE 3 DOSE ADULT IM: CPT | Performed by: STUDENT IN AN ORGANIZED HEALTH CARE EDUCATION/TRAINING PROGRAM

## 2023-05-26 PROCEDURE — 99214 OFFICE O/P EST MOD 30 MIN: CPT | Mod: 25 | Performed by: STUDENT IN AN ORGANIZED HEALTH CARE EDUCATION/TRAINING PROGRAM

## 2023-05-26 PROCEDURE — 3078F DIAST BP <80 MM HG: CPT | Performed by: STUDENT IN AN ORGANIZED HEALTH CARE EDUCATION/TRAINING PROGRAM

## 2023-05-26 RX ORDER — CALCIUM CITRATE/VITAMIN D3 200MG-6.25
TABLET ORAL
Qty: 100 STRIP | Refills: 0 | Status: SHIPPED | OUTPATIENT
Start: 2023-05-26 | End: 2023-07-18

## 2023-05-26 RX ORDER — METFORMIN HYDROCHLORIDE 500 MG/1
1000 TABLET, EXTENDED RELEASE ORAL DAILY
Qty: 180 TABLET | Refills: 3 | Status: SHIPPED | OUTPATIENT
Start: 2023-05-26

## 2023-05-26 ASSESSMENT — FIBROSIS 4 INDEX: FIB4 SCORE: 1.65

## 2023-05-26 NOTE — PROGRESS NOTES
"Subjective:     CC: diabetes    HPI:   Yoni presents today with    Type 2 diabetes mellitus (HCC)  Current meds: Metformin 500 mg twice daily, Trulicity 1.5 mg weekly (completed 4 weeks of 1.5 mg and 4 weeks of 0.75 mg but stopped due to cost), Jardiance 25 mg daily (completed 1 week but stopped due to cost)  Last A1c: 7 (5/18/23), 11.9 (1/4/23)  Last Microalbumin/Cr ratio: 22 (1/4/23),   Fasting sugars: N/A  Last diabetic foot exam: done today  Last retinal eye exam: would like to have it done at the next visit      Health Maintenance: Completed    ROS:  Constitutional:  Negative for chills, fever, fatigue, weight loss.  HEENT:  Negative for blurred vision, hearing loss, sore throat.  Respiratory:  Negative for cough, sputum production and shortness of breath.  Cardiovascular:  Negative for chest pain, palpitations and leg swelling.  Gastrointestinal:  Negative for abdominal pain, blood in stool, constipation, diarrhea and vomiting.  Musculoskeletal:  Negative for back pain, falls, joint pain and neck pain.  Skin:  Negative for rash.   Neurological:  Negative for dizziness, seizures, weakness and headaches.  Endo/Heme/Allergies:  Does not bruise/bleed easily.  Psychiatric/Behavioral:  Negative for depression, anxiety and suicidal thoughts.      Objective:     Exam:  /76   Pulse 93   Temp 36.4 °C (97.6 °F) (Temporal)   Resp 16   Ht 1.702 m (5' 7\")   Wt 66.2 kg (146 lb)   SpO2 100%   BMI 22.87 kg/m²  Body mass index is 22.87 kg/m².    Physical Exam    Gen: Alert and oriented, no acute distress.  Lungs: Normal effort, CTAB, no wheezing / rhonchi / rales.  CV: RRR, normal S1 and S2, no murmurs.    Monofilament testing with a 10 gram force:  Sensation intact: intact bilaterally  Visual Inspection: Feet without maceration, ulcers, fissures.  Pedal pulses: intact bilaterally        Labs:   Hospital Outpatient Visit on 05/18/2023   Component Date Value Ref Range Status    Alkaline Phosphatase " 05/18/2023 44  30 - 99 U/L Final    AST(SGOT) 05/18/2023 16  12 - 45 U/L Final    ALT(SGPT) 05/18/2023 11  2 - 50 U/L Final    Total Bilirubin 05/18/2023 0.3  0.1 - 1.5 mg/dL Final    Direct Bilirubin 05/18/2023 <0.2  0.1 - 0.5 mg/dL Final    Indirect Bilirubin 05/18/2023 see below  0.0 - 1.0 mg/dL Final    Comment: Unable to calculate indirect bilirubin due to a total or direct  bilirubin result being outside the measurement range of the analyzer.      Albumin 05/18/2023 4.3  3.2 - 4.9 g/dL Final    Total Protein 05/18/2023 7.0  6.0 - 8.2 g/dL Final    Cholesterol,Tot 05/18/2023 121  100 - 199 mg/dL Final    Triglycerides 05/18/2023 159 (H)  0 - 149 mg/dL Final    HDL 05/18/2023 50  >=40 mg/dL Final    LDL 05/18/2023 39  <100 mg/dL Final    Glycohemoglobin 05/18/2023 7.0 (H)  4.0 - 5.6 % Final    Comment: Increased risk for diabetes:  5.7 -6.4%  Diabetes:  >6.4%  Glycemic control for adults with diabetes:  <7.0%    The above interpretations are per ADA guidelines.  Diagnosis  of diabetes mellitus on the basis of elevated Hemoglobin A1c  should be confirmed by repeating the Hb A1c test.      Est Avg Glucose 05/18/2023 154  mg/dL Final    Comment: The eAG calculation is based on the A1c-Derived Daily Glucose  (ADAG) study.  See the ADA's website for additional information.      Fasting Status 05/18/2023 Fasting   Final         Assessment & Plan:     47 y.o. male with the following -     1. Type 2 diabetes mellitus without complication, without long-term current use of insulin (HCC)  Chronic, controlled.  A1c 7.  Unfortunately Trulicity and Jardiance were too expensive.  Metformin increased to ER 1000 mg daily.  Monofilament foot exam done today.  Patient was advised to keep a blood glucose log over the next 1 month and if levels are elevated consider adding glipizide.  Retinal screening will be done at the next visit.  - metFORMIN ER (GLUCOPHAGE XR) 500 MG TABLET SR 24 HR; Take 2 Tablets by mouth every day.   Dispense: 180 Tablet; Refill: 3  - glucose blood (TRUE METRIX BLOOD GLUCOSE TEST) strip; Use three times daily to check blood glucose levels.  Dispense: 100 Strip; Refill: 0  - Diabetic Monofilament LE Exam    2. Hypertriglyceridemia  Chronic, improved.  .  Continue fenofibrate 145 mg daily.    3. Dyslipidemia  Chronic, controlled.  TC, HDL, LDL WNL.  Continue atorvastatin 20 mg daily.  Repeat LFTs were normal.    4. Need for vaccination  Given Hep B #1 today.  - Hepatitis B Vaccine Adult IM        Return in about 1 month (around 6/26/2023) for Diabetes follow-up, retinal screening, Hep B #2.    Please note that this dictation was created using voice recognition software. I have made every reasonable attempt to correct obvious errors, but I expect that there are errors of grammar and possibly content that I did not discover before finalizing the note.

## 2023-05-26 NOTE — ASSESSMENT & PLAN NOTE
Current meds: Metformin 500 mg twice daily, Trulicity 1.5 mg weekly (completed 4 weeks of 1.5 mg and 4 weeks of 0.75 mg but stopped due to cost), Jardiance 25 mg daily (completed 1 week but stopped due to cost)  Last A1c: 7 (5/18/23), 11.9 (1/4/23)  Last Microalbumin/Cr ratio: 22 (1/4/23),   Fasting sugars: N/A  Last diabetic foot exam: done today  Last retinal eye exam: would like to have it done at the next visit

## 2023-07-07 DIAGNOSIS — E78.1 HYPERTRIGLYCERIDEMIA: ICD-10-CM

## 2023-07-07 DIAGNOSIS — E11.65 UNCONTROLLED TYPE 2 DIABETES MELLITUS WITH HYPERGLYCEMIA (HCC): ICD-10-CM

## 2023-07-10 ENCOUNTER — OFFICE VISIT (OUTPATIENT)
Dept: MEDICAL GROUP | Facility: PHYSICIAN GROUP | Age: 48
End: 2023-07-10
Payer: COMMERCIAL

## 2023-07-10 VITALS
WEIGHT: 151 LBS | HEART RATE: 86 BPM | TEMPERATURE: 97.6 F | OXYGEN SATURATION: 98 % | HEIGHT: 68 IN | DIASTOLIC BLOOD PRESSURE: 80 MMHG | BODY MASS INDEX: 22.88 KG/M2 | SYSTOLIC BLOOD PRESSURE: 136 MMHG

## 2023-07-10 DIAGNOSIS — E11.9 TYPE 2 DIABETES MELLITUS WITHOUT COMPLICATION, WITHOUT LONG-TERM CURRENT USE OF INSULIN (HCC): ICD-10-CM

## 2023-07-10 DIAGNOSIS — Z23 NEED FOR VACCINATION: ICD-10-CM

## 2023-07-10 PROCEDURE — 99213 OFFICE O/P EST LOW 20 MIN: CPT | Mod: 25 | Performed by: STUDENT IN AN ORGANIZED HEALTH CARE EDUCATION/TRAINING PROGRAM

## 2023-07-10 PROCEDURE — 3079F DIAST BP 80-89 MM HG: CPT | Performed by: STUDENT IN AN ORGANIZED HEALTH CARE EDUCATION/TRAINING PROGRAM

## 2023-07-10 PROCEDURE — 90746 HEPB VACCINE 3 DOSE ADULT IM: CPT | Performed by: STUDENT IN AN ORGANIZED HEALTH CARE EDUCATION/TRAINING PROGRAM

## 2023-07-10 PROCEDURE — 3075F SYST BP GE 130 - 139MM HG: CPT | Performed by: STUDENT IN AN ORGANIZED HEALTH CARE EDUCATION/TRAINING PROGRAM

## 2023-07-10 PROCEDURE — 90471 IMMUNIZATION ADMIN: CPT | Performed by: STUDENT IN AN ORGANIZED HEALTH CARE EDUCATION/TRAINING PROGRAM

## 2023-07-10 RX ORDER — FENOFIBRATE 145 MG/1
145 TABLET, COATED ORAL DAILY
Qty: 90 TABLET | Refills: 1 | Status: SHIPPED | OUTPATIENT
Start: 2023-07-10

## 2023-07-10 RX ORDER — ATORVASTATIN CALCIUM 20 MG/1
20 TABLET, FILM COATED ORAL NIGHTLY
Qty: 90 TABLET | Refills: 1 | Status: SHIPPED | OUTPATIENT
Start: 2023-07-10

## 2023-07-10 ASSESSMENT — FIBROSIS 4 INDEX: FIB4 SCORE: 1.65

## 2023-07-10 NOTE — ASSESSMENT & PLAN NOTE
Current meds:  Metformin ER 1000 mg daily  Last A1c:  7 (5/18/23)  Last Microalbumin/Cr ratio: 22 (1/3/23)  Fasting sugars:  130  Last diabetic foot exam:  5/26/23  Last retinal eye exam:  appt with ophthalmology next month

## 2023-07-10 NOTE — PROGRESS NOTES
"Subjective:     CC: diabetes    HPI:   Yoni presents today with    Type 2 diabetes mellitus (HCC)  Current meds:  Metformin ER 1000 mg daily  Last A1c:  7 (5/18/23)  Last Microalbumin/Cr ratio: 22 (1/3/23)  Fasting sugars:  130  Last diabetic foot exam:  5/26/23  Last retinal eye exam:  appt with ophthalmology next month      Health Maintenance: Completed    ROS:  Constitutional:  Negative for chills, fever, fatigue, weight loss.  HEENT:  Negative for blurred vision, hearing loss, sore throat.  Respiratory:  Negative for cough, sputum production and shortness of breath.  Cardiovascular:  Negative for chest pain, palpitations and leg swelling.  Gastrointestinal:  Negative for abdominal pain, blood in stool, constipation, diarrhea and vomiting.  Musculoskeletal:  Negative for back pain, falls, joint pain and neck pain.  Skin:  Negative for rash.   Neurological:  Negative for dizziness, seizures, weakness and headaches.  Endo/Heme/Allergies:  Does not bruise/bleed easily.  Psychiatric/Behavioral:  Negative for depression, anxiety and suicidal thoughts.      Objective:     Exam:  /80 (BP Location: Right arm, Patient Position: Sitting, BP Cuff Size: Adult)   Pulse 86   Temp 36.4 °C (97.6 °F) (Temporal)   Ht 1.715 m (5' 7.5\")   Wt 68.5 kg (151 lb)   SpO2 98%   BMI 23.30 kg/m²  Body mass index is 23.3 kg/m².    Physical Exam    Gen: Alert and oriented, no acute distress.  Lungs: Normal effort, CTAB, no wheezing / rhonchi / rales.  CV: RRR, normal S1 and S2, no murmurs.      Assessment & Plan:     47 y.o. male with the following -     1. Type 2 diabetes mellitus without complication, without long-term current use of insulin (HCC)  Chronic, controlled.  A1c 7.  Glucose log was reviewed but unfortunately readings were sporadically done during the day rather than fasting or postprandial.  Overall glucose readings did not seem to be significantly elevated so at this time we will continue Metformin ER 1000 mg daily. "  Patient has appointment with Ophthalmology in 1 month and was advised to send us records.  Recheck A1c at the next visit and if > 7 consider adding Glipizide (Trulicity and Jardiance were too expensive).    2. Need for vaccination  Given Hep B #2.  - Hepatitis B Vaccine Adult IM        Return in about 2 months (around 9/10/2023) for Diabetes follow-up, POC A1c.    Please note that this dictation was created using voice recognition software. I have made every reasonable attempt to correct obvious errors, but I expect that there are errors of grammar and possibly content that I did not discover before finalizing the note.

## 2023-07-12 ENCOUNTER — TELEPHONE (OUTPATIENT)
Dept: VASCULAR LAB | Facility: MEDICAL CENTER | Age: 48
End: 2023-07-12
Payer: COMMERCIAL

## 2023-07-12 NOTE — TELEPHONE ENCOUNTER
Pt canceled their fu DM appointment on 7/11.    S/w pt - he does not wish to reschedule at this time and declined further f/u    Ann Tavarez, PharmD

## 2023-07-18 DIAGNOSIS — E11.9 TYPE 2 DIABETES MELLITUS WITHOUT COMPLICATION, WITHOUT LONG-TERM CURRENT USE OF INSULIN (HCC): ICD-10-CM

## 2023-07-18 RX ORDER — CALCIUM CITRATE/VITAMIN D3 200MG-6.25
TABLET ORAL
Qty: 100 STRIP | Refills: 1 | Status: SHIPPED | OUTPATIENT
Start: 2023-07-18

## 2023-09-27 ENCOUNTER — OFFICE VISIT (OUTPATIENT)
Dept: MEDICAL GROUP | Facility: PHYSICIAN GROUP | Age: 48
End: 2023-09-27
Payer: COMMERCIAL

## 2023-09-27 VITALS
HEIGHT: 67 IN | OXYGEN SATURATION: 100 % | DIASTOLIC BLOOD PRESSURE: 110 MMHG | SYSTOLIC BLOOD PRESSURE: 142 MMHG | WEIGHT: 151 LBS | TEMPERATURE: 97.5 F | BODY MASS INDEX: 23.7 KG/M2 | HEART RATE: 79 BPM

## 2023-09-27 DIAGNOSIS — G47.00 INSOMNIA, UNSPECIFIED TYPE: ICD-10-CM

## 2023-09-27 DIAGNOSIS — E11.9 TYPE 2 DIABETES MELLITUS WITHOUT COMPLICATION, WITHOUT LONG-TERM CURRENT USE OF INSULIN (HCC): ICD-10-CM

## 2023-09-27 DIAGNOSIS — Z23 NEED FOR VACCINATION: ICD-10-CM

## 2023-09-27 LAB
HBA1C MFR BLD: 8.2 % (ref ?–5.8)
POCT INT CON NEG: NEGATIVE
POCT INT CON POS: POSITIVE

## 2023-09-27 PROCEDURE — 3080F DIAST BP >= 90 MM HG: CPT | Performed by: STUDENT IN AN ORGANIZED HEALTH CARE EDUCATION/TRAINING PROGRAM

## 2023-09-27 PROCEDURE — 99214 OFFICE O/P EST MOD 30 MIN: CPT | Mod: 25 | Performed by: STUDENT IN AN ORGANIZED HEALTH CARE EDUCATION/TRAINING PROGRAM

## 2023-09-27 PROCEDURE — 3077F SYST BP >= 140 MM HG: CPT | Performed by: STUDENT IN AN ORGANIZED HEALTH CARE EDUCATION/TRAINING PROGRAM

## 2023-09-27 PROCEDURE — 90471 IMMUNIZATION ADMIN: CPT | Performed by: STUDENT IN AN ORGANIZED HEALTH CARE EDUCATION/TRAINING PROGRAM

## 2023-09-27 PROCEDURE — 90686 IIV4 VACC NO PRSV 0.5 ML IM: CPT | Performed by: STUDENT IN AN ORGANIZED HEALTH CARE EDUCATION/TRAINING PROGRAM

## 2023-09-27 PROCEDURE — 83036 HEMOGLOBIN GLYCOSYLATED A1C: CPT | Performed by: STUDENT IN AN ORGANIZED HEALTH CARE EDUCATION/TRAINING PROGRAM

## 2023-09-27 RX ORDER — TRAZODONE HYDROCHLORIDE 50 MG/1
50 TABLET ORAL NIGHTLY
Qty: 90 TABLET | Refills: 0 | Status: SHIPPED | OUTPATIENT
Start: 2023-09-27

## 2023-09-27 ASSESSMENT — FIBROSIS 4 INDEX: FIB4 SCORE: 1.65

## 2023-09-27 NOTE — PROGRESS NOTES
"Subjective:     Chief Complaint   Patient presents with    Diabetes Follow-up    Insomnia     Waking up from sleep          HPI:   Yoni presents today with    Insomnia  Patient complains of difficulty falling asleep and staying asleep.  Patient has tried ZzzQuil and Unisom.  Patient denies anxiety and depression.    Type 2 diabetes mellitus (HCC)  Current meds:  Metformin ER 1000 mg daily  Last A1c:  8.2 (POC today), 7 (5/18/23)  Last Microalbumin/Cr ratio: 22 (1/3/23)  Fasting sugars:  118-130  2 hours post-prandial:   Last diabetic foot exam:  5/26/23  Last retinal eye exam:  8/30/23      Health Maintenance: Completed    ROS:  Negative except as stated above.      Objective:     Exam:  BP (!) 142/110 (BP Location: Right arm, Patient Position: Sitting, BP Cuff Size: Adult)   Pulse 79   Temp 36.4 °C (97.5 °F) (Temporal)   Ht 1.702 m (5' 7\")   Wt 68.5 kg (151 lb)   SpO2 100%   BMI 23.65 kg/m²  Body mass index is 23.65 kg/m².    Physical Exam    Gen: Alert and oriented, no acute distress.  Lungs: Normal effort, CTAB, no wheezing / rhonchi / rales.  CV: RRR, normal S1 and S2, no murmurs.      Assessment & Plan:     47 y.o. male with the following -     1. Type 2 diabetes mellitus without complication, without long-term current use of insulin (HCC)  Chronic, uncontrolled.  POC A1c 8.2 today.  Prescribed Januvia 100 mg daily.  Side effects of medication were discussed.  Continue metformin ER 1000 mg daily.  Send in glipizide if Januvia is not covered by insurance.  UTD with monofilament foot exam and retinal screening.  - POCT Hemoglobin A1C  - SITagliptin (JANUVIA) 100 MG Tab; Take 1 Tablet by mouth every day.  Dispense: 90 Tablet; Refill: 1    2. Insomnia, unspecified type  Chronic, uncontrolled.  No improvement with ZzzQuil and Unisom.  Prescribed trazodone 50 mg at bedtime as needed.  Patient can increase dose to 100-150 mg as needed.  Side effects of medication were discussed.  - traZODone (DESYREL) 50 " MG Tab; Take 1 Tablet by mouth every evening.  Dispense: 90 Tablet; Refill: 0    3. Need for vaccination  - INFLUENZA VACCINE QUAD INJ (PF)        Return in about 3 months (around 12/27/2023) for Diabetes follow-up, POC A1c.    Please note that this dictation was created using voice recognition software. I have made every reasonable attempt to correct obvious errors, but I expect that there are errors of grammar and possibly content that I did not discover before finalizing the note.

## 2023-09-27 NOTE — ASSESSMENT & PLAN NOTE
Current meds:  Metformin ER 1000 mg daily  Last A1c:  8.2 (POC today), 7 (5/18/23)  Last Microalbumin/Cr ratio: 22 (1/3/23)  Fasting sugars:  118-130  2 hours post-prandial:   Last diabetic foot exam:  5/26/23  Last retinal eye exam:  8/30/23

## 2023-09-27 NOTE — ASSESSMENT & PLAN NOTE
Patient complains of difficulty falling asleep and staying asleep.  Patient has tried ZzzQuil and Unisom.  Patient denies anxiety and depression.

## 2023-09-28 DIAGNOSIS — E11.9 TYPE 2 DIABETES MELLITUS WITHOUT COMPLICATION, WITHOUT LONG-TERM CURRENT USE OF INSULIN (HCC): Chronic | ICD-10-CM

## 2023-09-28 RX ORDER — GLIPIZIDE 5 MG/1
5 TABLET ORAL 2 TIMES DAILY
Qty: 180 TABLET | Refills: 0 | Status: SHIPPED | OUTPATIENT
Start: 2023-09-28

## 2024-01-09 ENCOUNTER — APPOINTMENT (OUTPATIENT)
Dept: MEDICAL GROUP | Facility: PHYSICIAN GROUP | Age: 49
End: 2024-01-09
Payer: COMMERCIAL

## 2024-05-08 ENCOUNTER — APPOINTMENT (OUTPATIENT)
Dept: MEDICAL GROUP | Facility: PHYSICIAN GROUP | Age: 49
End: 2024-05-08
Payer: COMMERCIAL

## 2024-06-14 ENCOUNTER — APPOINTMENT (OUTPATIENT)
Dept: MEDICAL GROUP | Facility: PHYSICIAN GROUP | Age: 49
End: 2024-06-14
Payer: COMMERCIAL

## 2024-06-14 VITALS
WEIGHT: 158.8 LBS | BODY MASS INDEX: 24.92 KG/M2 | SYSTOLIC BLOOD PRESSURE: 152 MMHG | HEART RATE: 83 BPM | TEMPERATURE: 97.2 F | DIASTOLIC BLOOD PRESSURE: 86 MMHG | OXYGEN SATURATION: 100 % | HEIGHT: 67 IN

## 2024-06-14 DIAGNOSIS — G47.00 INSOMNIA, UNSPECIFIED TYPE: Chronic | ICD-10-CM

## 2024-06-14 DIAGNOSIS — E78.1 HYPERTRIGLYCERIDEMIA: ICD-10-CM

## 2024-06-14 DIAGNOSIS — E11.65 UNCONTROLLED TYPE 2 DIABETES MELLITUS WITH HYPERGLYCEMIA (HCC): ICD-10-CM

## 2024-06-14 DIAGNOSIS — M1A.9XX0 CHRONIC GOUT WITHOUT TOPHUS, UNSPECIFIED CAUSE, UNSPECIFIED SITE: ICD-10-CM

## 2024-06-14 LAB
HBA1C MFR BLD: 8.6 % (ref ?–5.8)
POCT INT CON NEG: NEGATIVE
POCT INT CON POS: POSITIVE

## 2024-06-14 PROCEDURE — 3079F DIAST BP 80-89 MM HG: CPT | Performed by: STUDENT IN AN ORGANIZED HEALTH CARE EDUCATION/TRAINING PROGRAM

## 2024-06-14 PROCEDURE — 83036 HEMOGLOBIN GLYCOSYLATED A1C: CPT | Performed by: STUDENT IN AN ORGANIZED HEALTH CARE EDUCATION/TRAINING PROGRAM

## 2024-06-14 PROCEDURE — 99214 OFFICE O/P EST MOD 30 MIN: CPT | Performed by: STUDENT IN AN ORGANIZED HEALTH CARE EDUCATION/TRAINING PROGRAM

## 2024-06-14 PROCEDURE — 3077F SYST BP >= 140 MM HG: CPT | Performed by: STUDENT IN AN ORGANIZED HEALTH CARE EDUCATION/TRAINING PROGRAM

## 2024-06-14 RX ORDER — ZOLPIDEM TARTRATE 5 MG/1
5 TABLET ORAL NIGHTLY PRN
Qty: 30 TABLET | Refills: 0 | Status: SHIPPED | OUTPATIENT
Start: 2024-06-14 | End: 2024-07-14

## 2024-06-14 RX ORDER — METFORMIN HYDROCHLORIDE 500 MG/1
1000 TABLET, EXTENDED RELEASE ORAL DAILY
Qty: 180 TABLET | Refills: 3 | Status: SHIPPED | OUTPATIENT
Start: 2024-06-14

## 2024-06-14 RX ORDER — ATORVASTATIN CALCIUM 20 MG/1
20 TABLET, FILM COATED ORAL NIGHTLY
Qty: 90 TABLET | Refills: 3 | Status: SHIPPED | OUTPATIENT
Start: 2024-06-14

## 2024-06-14 RX ORDER — GLIPIZIDE 10 MG/1
10 TABLET ORAL 2 TIMES DAILY
Qty: 180 TABLET | Refills: 3 | Status: SHIPPED | OUTPATIENT
Start: 2024-06-14

## 2024-06-14 RX ORDER — FENOFIBRATE 145 MG/1
145 TABLET, COATED ORAL DAILY
Qty: 90 TABLET | Refills: 3 | Status: SHIPPED | OUTPATIENT
Start: 2024-06-14

## 2024-06-14 ASSESSMENT — FIBROSIS 4 INDEX: FIB4 SCORE: 1.69

## 2024-06-14 ASSESSMENT — PATIENT HEALTH QUESTIONNAIRE - PHQ9: CLINICAL INTERPRETATION OF PHQ2 SCORE: 0

## 2024-06-14 NOTE — PROGRESS NOTES
Subjective:     Chief Complaint   Patient presents with    Follow-Up         History of Present Illness  The patient presents for evaluation of multiple medical concerns.    POC A1c 8.6 today, which has increased from 8.2.  Trulicity, jardiance, januvia were too expensive and patient was started on glipizide 5 mg twice daily.  Patient is also on metformin ER 1000 mg daily.  His postprandial blood glucose levels typically range from high to low, with a peak reading of 220.    The patient reports difficulty in initiating and maintaining sleep. Despite attempts to increase his trazodone dosage to 250 to 300 mg, this did not yield any improvement. He has previously tried ZzzQuil and Unisom, but found no relief. His sleep duration is approximately 30 minutes after taking trazodone. He denies any symptoms of anxiety or depression.  Patient uses alcohol on the weekend to help him sleep.  Patient reports feeling very dizzy and tired at work due to lack of sleep.    The patient reports his last gout attack was 2008.        Health Maintenance: Completed    ROS:  Negative except as stated above.      Objective:     Exam:  BP (!) 152/86  There is no height or weight on file to calculate BMI.    Physical Exam    Gen: Alert and oriented, no acute distress.  Lungs: Normal effort, CTAB, no wheezing / rhonchi / rales.  CV: RRR, normal S1 and S2, no murmurs.      Assessment & Plan:     48 y.o. male with the following -     1. Uncontrolled type 2 diabetes mellitus with hyperglycemia (HCC)  Chronic, uncontrolled.  POC A1c 8.6 today.  Trulicity, jardiance, januvia were too expensive.  Glipizide increased to 10 mg twice daily.  Continue metformin ER 1000 mg daily.  Monofilament foot exam will be done at the next visit.  UTD with retinal screening.  - glipiZIDE (GLUCOTROL) 10 MG Tab; Take 1 Tablet by mouth 2 times a day.  Dispense: 180 Tablet; Refill: 3  - POCT Hemoglobin A1C  - metFORMIN ER (GLUCOPHAGE XR) 500 MG TABLET SR 24 HR; Take 2  Tablets by mouth every day.  Dispense: 180 Tablet; Refill: 3  - atorvastatin (LIPITOR) 20 MG Tab; Take 1 Tablet by mouth every evening.  Dispense: 90 Tablet; Refill: 3  - CBC WITHOUT DIFFERENTIAL; Future  - Comp Metabolic Panel; Future  - Lipid Profile; Future  - MICROALBUMIN CREAT RATIO URINE; Future    2. Hypertriglyceridemia  Chronic, uncontrolled.  Continue fenofibrate 145 mg daily.  Limit alcohol use.  - fenofibrate (TRICOR) 145 MG Tab; Take 1 Tablet by mouth every day.  Dispense: 90 Tablet; Refill: 3    3. Insomnia, unspecified type  Chronic, uncontrolled.  No improvement with trazodone, zzzquil, unisom.  Lack of sleep affecting quality of life.  Prescribed ambien 5 mg at bedtime as needed.  Side effects of medication were discussed.  CS agreement signed today.  PDMP reviewed.  - Controlled Substance Treatment Agreement  - zolpidem (AMBIEN) 5 MG Tab; Take 1 Tablet by mouth at bedtime as needed for Sleep for up to 30 days.  Dispense: 30 Tablet; Refill: 0    4. Chronic gout without tophus, unspecified cause, unspecified site  Chronic, controlled.  Patient reports last gout attack was 2008.  No medication at this time.  Limit alcohol use.  - URIC ACID; Future          Return in about 1 month (around 7/14/2024) for Annual preventive visit, Discuss labs, CS refill.    Verbal consent was acquired by the patient to use [employer] ambient listening note generation during this visit: Yes .    Please note that this dictation was created using voice recognition software. I have made every reasonable attempt to correct obvious errors, but I expect that there are errors of grammar and possibly content that I did not discover before finalizing the note.

## 2024-07-12 ENCOUNTER — HOSPITAL ENCOUNTER (OUTPATIENT)
Dept: LAB | Facility: MEDICAL CENTER | Age: 49
End: 2024-07-12
Attending: STUDENT IN AN ORGANIZED HEALTH CARE EDUCATION/TRAINING PROGRAM
Payer: COMMERCIAL

## 2024-07-12 DIAGNOSIS — M1A.9XX0 CHRONIC GOUT WITHOUT TOPHUS, UNSPECIFIED CAUSE, UNSPECIFIED SITE: ICD-10-CM

## 2024-07-12 DIAGNOSIS — E11.65 UNCONTROLLED TYPE 2 DIABETES MELLITUS WITH HYPERGLYCEMIA (HCC): ICD-10-CM

## 2024-07-12 LAB
ALBUMIN SERPL BCP-MCNC: 3.9 G/DL (ref 3.2–4.9)
ALBUMIN/GLOB SERPL: 1.3 G/DL
ALP SERPL-CCNC: 59 U/L (ref 30–99)
ALT SERPL-CCNC: 38 U/L (ref 2–50)
ANION GAP SERPL CALC-SCNC: 15 MMOL/L (ref 7–16)
AST SERPL-CCNC: 56 U/L (ref 12–45)
BILIRUB SERPL-MCNC: 0.6 MG/DL (ref 0.1–1.5)
BUN SERPL-MCNC: 7 MG/DL (ref 8–22)
CALCIUM ALBUM COR SERPL-MCNC: 8.7 MG/DL (ref 8.5–10.5)
CALCIUM SERPL-MCNC: 8.6 MG/DL (ref 8.5–10.5)
CHLORIDE SERPL-SCNC: 98 MMOL/L (ref 96–112)
CHOLEST SERPL-MCNC: 148 MG/DL (ref 100–199)
CO2 SERPL-SCNC: 21 MMOL/L (ref 20–33)
CREAT SERPL-MCNC: 0.78 MG/DL (ref 0.5–1.4)
CREAT UR-MCNC: 39.69 MG/DL
ERYTHROCYTE [DISTWIDTH] IN BLOOD BY AUTOMATED COUNT: 43 FL (ref 35.9–50)
FASTING STATUS PATIENT QL REPORTED: NORMAL
GFR SERPLBLD CREATININE-BSD FMLA CKD-EPI: 108 ML/MIN/1.73 M 2
GLOBULIN SER CALC-MCNC: 2.9 G/DL (ref 1.9–3.5)
GLUCOSE SERPL-MCNC: 163 MG/DL (ref 65–99)
HCT VFR BLD AUTO: 38.3 % (ref 42–52)
HDLC SERPL-MCNC: 57 MG/DL
HGB BLD-MCNC: 12.5 G/DL (ref 14–18)
LDLC SERPL CALC-MCNC: 50 MG/DL
MCH RBC QN AUTO: 26.7 PG (ref 27–33)
MCHC RBC AUTO-ENTMCNC: 32.6 G/DL (ref 32.3–36.5)
MCV RBC AUTO: 81.7 FL (ref 81.4–97.8)
MICROALBUMIN UR-MCNC: <1.2 MG/DL
MICROALBUMIN/CREAT UR: NORMAL MG/G (ref 0–30)
PLATELET # BLD AUTO: 189 K/UL (ref 164–446)
PMV BLD AUTO: 12 FL (ref 9–12.9)
POTASSIUM SERPL-SCNC: 3.8 MMOL/L (ref 3.6–5.5)
PROT SERPL-MCNC: 6.8 G/DL (ref 6–8.2)
RBC # BLD AUTO: 4.69 M/UL (ref 4.7–6.1)
SODIUM SERPL-SCNC: 134 MMOL/L (ref 135–145)
TRIGL SERPL-MCNC: 204 MG/DL (ref 0–149)
URATE SERPL-MCNC: 5.7 MG/DL (ref 2.5–8.3)
WBC # BLD AUTO: 6.1 K/UL (ref 4.8–10.8)

## 2024-07-12 PROCEDURE — 36415 COLL VENOUS BLD VENIPUNCTURE: CPT

## 2024-07-12 PROCEDURE — 82570 ASSAY OF URINE CREATININE: CPT

## 2024-07-12 PROCEDURE — 80061 LIPID PANEL: CPT

## 2024-07-12 PROCEDURE — 82043 UR ALBUMIN QUANTITATIVE: CPT

## 2024-07-12 PROCEDURE — 84550 ASSAY OF BLOOD/URIC ACID: CPT

## 2024-07-12 PROCEDURE — 80053 COMPREHEN METABOLIC PANEL: CPT

## 2024-07-12 PROCEDURE — 85027 COMPLETE CBC AUTOMATED: CPT

## 2024-07-16 SDOH — ECONOMIC STABILITY: HOUSING INSECURITY: IN THE LAST 12 MONTHS, HOW MANY PLACES HAVE YOU LIVED?: 1

## 2024-07-16 SDOH — ECONOMIC STABILITY: INCOME INSECURITY: IN THE LAST 12 MONTHS, WAS THERE A TIME WHEN YOU WERE NOT ABLE TO PAY THE MORTGAGE OR RENT ON TIME?: NO

## 2024-07-16 SDOH — ECONOMIC STABILITY: INCOME INSECURITY: HOW HARD IS IT FOR YOU TO PAY FOR THE VERY BASICS LIKE FOOD, HOUSING, MEDICAL CARE, AND HEATING?: NOT VERY HARD

## 2024-07-16 SDOH — HEALTH STABILITY: PHYSICAL HEALTH: ON AVERAGE, HOW MANY MINUTES DO YOU ENGAGE IN EXERCISE AT THIS LEVEL?: 30 MIN

## 2024-07-16 SDOH — HEALTH STABILITY: MENTAL HEALTH
STRESS IS WHEN SOMEONE FEELS TENSE, NERVOUS, ANXIOUS, OR CAN'T SLEEP AT NIGHT BECAUSE THEIR MIND IS TROUBLED. HOW STRESSED ARE YOU?: TO SOME EXTENT

## 2024-07-16 SDOH — HEALTH STABILITY: PHYSICAL HEALTH: ON AVERAGE, HOW MANY DAYS PER WEEK DO YOU ENGAGE IN MODERATE TO STRENUOUS EXERCISE (LIKE A BRISK WALK)?: 5 DAYS

## 2024-07-16 SDOH — ECONOMIC STABILITY: FOOD INSECURITY: WITHIN THE PAST 12 MONTHS, YOU WORRIED THAT YOUR FOOD WOULD RUN OUT BEFORE YOU GOT MONEY TO BUY MORE.: PATIENT DECLINED

## 2024-07-16 SDOH — ECONOMIC STABILITY: FOOD INSECURITY: WITHIN THE PAST 12 MONTHS, THE FOOD YOU BOUGHT JUST DIDN'T LAST AND YOU DIDN'T HAVE MONEY TO GET MORE.: PATIENT DECLINED

## 2024-07-16 ASSESSMENT — LIFESTYLE VARIABLES
HOW OFTEN DO YOU HAVE SIX OR MORE DRINKS ON ONE OCCASION: LESS THAN MONTHLY
SKIP TO QUESTIONS 9-10: 0
HOW MANY STANDARD DRINKS CONTAINING ALCOHOL DO YOU HAVE ON A TYPICAL DAY: 3 OR 4
HOW OFTEN DO YOU HAVE A DRINK CONTAINING ALCOHOL: 2-3 TIMES A WEEK
AUDIT-C TOTAL SCORE: 5

## 2024-07-16 ASSESSMENT — SOCIAL DETERMINANTS OF HEALTH (SDOH)
HOW OFTEN DO YOU HAVE SIX OR MORE DRINKS ON ONE OCCASION: LESS THAN MONTHLY
HOW OFTEN DO YOU ATTENT MEETINGS OF THE CLUB OR ORGANIZATION YOU BELONG TO?: MORE THAN 4 TIMES PER YEAR
HOW OFTEN DO YOU GET TOGETHER WITH FRIENDS OR RELATIVES?: PATIENT DECLINED
DO YOU BELONG TO ANY CLUBS OR ORGANIZATIONS SUCH AS CHURCH GROUPS UNIONS, FRATERNAL OR ATHLETIC GROUPS, OR SCHOOL GROUPS?: YES
IN THE PAST 12 MONTHS, HAS THE ELECTRIC, GAS, OIL, OR WATER COMPANY THREATENED TO SHUT OFF SERVICE IN YOUR HOME?: NO
DO YOU BELONG TO ANY CLUBS OR ORGANIZATIONS SUCH AS CHURCH GROUPS UNIONS, FRATERNAL OR ATHLETIC GROUPS, OR SCHOOL GROUPS?: YES
HOW OFTEN DO YOU ATTENT MEETINGS OF THE CLUB OR ORGANIZATION YOU BELONG TO?: MORE THAN 4 TIMES PER YEAR
IN A TYPICAL WEEK, HOW MANY TIMES DO YOU TALK ON THE PHONE WITH FAMILY, FRIENDS, OR NEIGHBORS?: MORE THAN THREE TIMES A WEEK
HOW OFTEN DO YOU GET TOGETHER WITH FRIENDS OR RELATIVES?: PATIENT DECLINED
HOW OFTEN DO YOU ATTEND CHURCH OR RELIGIOUS SERVICES?: NEVER
IN A TYPICAL WEEK, HOW MANY TIMES DO YOU TALK ON THE PHONE WITH FAMILY, FRIENDS, OR NEIGHBORS?: MORE THAN THREE TIMES A WEEK
HOW OFTEN DO YOU ATTEND CHURCH OR RELIGIOUS SERVICES?: NEVER
HOW MANY DRINKS CONTAINING ALCOHOL DO YOU HAVE ON A TYPICAL DAY WHEN YOU ARE DRINKING: 3 OR 4
HOW OFTEN DO YOU HAVE A DRINK CONTAINING ALCOHOL: 2-3 TIMES A WEEK
HOW HARD IS IT FOR YOU TO PAY FOR THE VERY BASICS LIKE FOOD, HOUSING, MEDICAL CARE, AND HEATING?: NOT VERY HARD
WITHIN THE PAST 12 MONTHS, YOU WORRIED THAT YOUR FOOD WOULD RUN OUT BEFORE YOU GOT THE MONEY TO BUY MORE: PATIENT DECLINED

## 2024-07-19 ENCOUNTER — OFFICE VISIT (OUTPATIENT)
Dept: MEDICAL GROUP | Facility: PHYSICIAN GROUP | Age: 49
End: 2024-07-19
Payer: COMMERCIAL

## 2024-07-19 VITALS
HEART RATE: 96 BPM | DIASTOLIC BLOOD PRESSURE: 68 MMHG | TEMPERATURE: 98 F | BODY MASS INDEX: 22.39 KG/M2 | HEIGHT: 68 IN | OXYGEN SATURATION: 98 % | WEIGHT: 147.7 LBS | SYSTOLIC BLOOD PRESSURE: 152 MMHG

## 2024-07-19 DIAGNOSIS — E11.65 UNCONTROLLED TYPE 2 DIABETES MELLITUS WITH HYPERGLYCEMIA (HCC): ICD-10-CM

## 2024-07-19 DIAGNOSIS — G47.00 INSOMNIA, UNSPECIFIED TYPE: Chronic | ICD-10-CM

## 2024-07-19 DIAGNOSIS — Z00.00 WELLNESS EXAMINATION: ICD-10-CM

## 2024-07-19 DIAGNOSIS — M1A.9XX0 CHRONIC GOUT WITHOUT TOPHUS, UNSPECIFIED CAUSE, UNSPECIFIED SITE: ICD-10-CM

## 2024-07-19 DIAGNOSIS — R74.8 ELEVATED LIVER ENZYMES: ICD-10-CM

## 2024-07-19 DIAGNOSIS — D64.9 ANEMIA, UNSPECIFIED TYPE: ICD-10-CM

## 2024-07-19 DIAGNOSIS — E78.5 DYSLIPIDEMIA: ICD-10-CM

## 2024-07-19 PROCEDURE — 99214 OFFICE O/P EST MOD 30 MIN: CPT | Mod: 25 | Performed by: STUDENT IN AN ORGANIZED HEALTH CARE EDUCATION/TRAINING PROGRAM

## 2024-07-19 PROCEDURE — 3077F SYST BP >= 140 MM HG: CPT | Performed by: STUDENT IN AN ORGANIZED HEALTH CARE EDUCATION/TRAINING PROGRAM

## 2024-07-19 PROCEDURE — 3078F DIAST BP <80 MM HG: CPT | Performed by: STUDENT IN AN ORGANIZED HEALTH CARE EDUCATION/TRAINING PROGRAM

## 2024-07-19 PROCEDURE — 99396 PREV VISIT EST AGE 40-64: CPT | Performed by: STUDENT IN AN ORGANIZED HEALTH CARE EDUCATION/TRAINING PROGRAM

## 2024-07-19 RX ORDER — HYDROXYZINE 50 MG/1
50 TABLET, FILM COATED ORAL
Qty: 90 TABLET | Refills: 0 | Status: SHIPPED | OUTPATIENT
Start: 2024-07-19

## 2024-07-19 ASSESSMENT — FIBROSIS 4 INDEX: FIB4 SCORE: 2.4

## 2024-09-13 ENCOUNTER — HOSPITAL ENCOUNTER (OUTPATIENT)
Dept: LAB | Facility: MEDICAL CENTER | Age: 49
End: 2024-09-13
Attending: STUDENT IN AN ORGANIZED HEALTH CARE EDUCATION/TRAINING PROGRAM
Payer: COMMERCIAL

## 2024-09-13 DIAGNOSIS — E11.65 UNCONTROLLED TYPE 2 DIABETES MELLITUS WITH HYPERGLYCEMIA (HCC): ICD-10-CM

## 2024-09-13 DIAGNOSIS — D64.9 ANEMIA, UNSPECIFIED TYPE: ICD-10-CM

## 2024-09-13 DIAGNOSIS — R74.8 ELEVATED LIVER ENZYMES: ICD-10-CM

## 2024-09-13 LAB
EST. AVERAGE GLUCOSE BLD GHB EST-MCNC: 160 MG/DL
HBA1C MFR BLD: 7.2 % (ref 4–5.6)

## 2024-09-13 PROCEDURE — 83550 IRON BINDING TEST: CPT

## 2024-09-13 PROCEDURE — 80076 HEPATIC FUNCTION PANEL: CPT

## 2024-09-13 PROCEDURE — 83540 ASSAY OF IRON: CPT

## 2024-09-13 PROCEDURE — 36415 COLL VENOUS BLD VENIPUNCTURE: CPT

## 2024-09-13 PROCEDURE — 82607 VITAMIN B-12: CPT

## 2024-09-13 PROCEDURE — 83036 HEMOGLOBIN GLYCOSYLATED A1C: CPT

## 2024-09-13 PROCEDURE — 82728 ASSAY OF FERRITIN: CPT

## 2024-09-14 LAB
ALBUMIN SERPL BCP-MCNC: 4 G/DL (ref 3.2–4.9)
ALP SERPL-CCNC: 75 U/L (ref 30–99)
ALT SERPL-CCNC: 48 U/L (ref 2–50)
AST SERPL-CCNC: 64 U/L (ref 12–45)
BILIRUB CONJ SERPL-MCNC: <0.2 MG/DL (ref 0.1–0.5)
BILIRUB INDIRECT SERPL-MCNC: ABNORMAL MG/DL (ref 0–1)
BILIRUB SERPL-MCNC: 0.5 MG/DL (ref 0.1–1.5)
FERRITIN SERPL-MCNC: 1621 NG/ML (ref 22–322)
IRON SATN MFR SERPL: 36 % (ref 15–55)
IRON SERPL-MCNC: 104 UG/DL (ref 50–180)
PROT SERPL-MCNC: 6.9 G/DL (ref 6–8.2)
TIBC SERPL-MCNC: 287 UG/DL (ref 250–450)
UIBC SERPL-MCNC: 183 UG/DL (ref 110–370)
VIT B12 SERPL-MCNC: 547 PG/ML (ref 211–911)

## 2024-09-27 ENCOUNTER — OFFICE VISIT (OUTPATIENT)
Dept: MEDICAL GROUP | Facility: PHYSICIAN GROUP | Age: 49
End: 2024-09-27
Payer: COMMERCIAL

## 2024-09-27 VITALS
BODY MASS INDEX: 23.62 KG/M2 | WEIGHT: 150.5 LBS | HEIGHT: 67 IN | HEART RATE: 82 BPM | TEMPERATURE: 97 F | OXYGEN SATURATION: 100 % | SYSTOLIC BLOOD PRESSURE: 184 MMHG | DIASTOLIC BLOOD PRESSURE: 98 MMHG

## 2024-09-27 DIAGNOSIS — E78.5 DYSLIPIDEMIA: ICD-10-CM

## 2024-09-27 DIAGNOSIS — D64.9 ANEMIA, UNSPECIFIED TYPE: ICD-10-CM

## 2024-09-27 DIAGNOSIS — R79.89 ELEVATED FERRITIN: ICD-10-CM

## 2024-09-27 DIAGNOSIS — E11.65 UNCONTROLLED TYPE 2 DIABETES MELLITUS WITH HYPERGLYCEMIA (HCC): ICD-10-CM

## 2024-09-27 DIAGNOSIS — R74.8 ELEVATED LIVER ENZYMES: ICD-10-CM

## 2024-09-27 DIAGNOSIS — I10 PRIMARY HYPERTENSION: ICD-10-CM

## 2024-09-27 DIAGNOSIS — F10.90 HEAVY ALCOHOL USE: ICD-10-CM

## 2024-09-27 DIAGNOSIS — E78.1 HYPERTRIGLYCERIDEMIA: ICD-10-CM

## 2024-09-27 PROCEDURE — 3080F DIAST BP >= 90 MM HG: CPT | Performed by: STUDENT IN AN ORGANIZED HEALTH CARE EDUCATION/TRAINING PROGRAM

## 2024-09-27 PROCEDURE — 99214 OFFICE O/P EST MOD 30 MIN: CPT | Performed by: STUDENT IN AN ORGANIZED HEALTH CARE EDUCATION/TRAINING PROGRAM

## 2024-09-27 PROCEDURE — 3077F SYST BP >= 140 MM HG: CPT | Performed by: STUDENT IN AN ORGANIZED HEALTH CARE EDUCATION/TRAINING PROGRAM

## 2024-09-27 ASSESSMENT — FIBROSIS 4 INDEX: FIB4 SCORE: 2.44

## 2024-09-27 NOTE — PROGRESS NOTES
"Subjective:     Chief Complaint   Patient presents with    Lab Results       History of Present Illness  The patient presents to discuss labs.    He is currently on metformin ER 1000 mg daily and glipizide 10 mg twice daily, which he takes every other day due to concerns about potential liver damage. He has not yet scheduled an eye appointment for a retinal exam.    He was previously on lisinopril for blood pressure management and blood pressure has been elevated for the past few visits.  /98 today and repeat /90. He does not monitor his blood pressure at home and is reluctant to start new medication due to his current regimen. He consumed coffee this morning.    He consumes alcohol to aid sleep, typically having 3-4 beers each night. He took hydroxyzine 50 mg for a few days but discontinued it due to his alcohol consumption.  He has also tried trazodone, ZzzQuil, Unisom, Ambien in the past for insomnia.  He declined referral to psychiatry for CBT.    Patient reports every visit with us costs him $400-500 and he is getting new insurance in January.  He would like to hold off on labs and imaging until then.  He is also concerned about costs associated with vaccines and will get them at the pharmacy.      Health Maintenance: Completed  - will get influenza and Hep B #3 at pharmacy    ROS:  Negative except as stated above.      Objective:     Exam:  BP (!) 184/98 (BP Location: Right arm, Patient Position: Sitting, BP Cuff Size: Adult)   Pulse 82   Temp 36.1 °C (97 °F) (Temporal)   Ht 1.702 m (5' 7\")   Wt 68.3 kg (150 lb 8 oz)   SpO2 100%   BMI 23.57 kg/m²  Body mass index is 23.57 kg/m².    Physical Exam    Gen: Alert and oriented, no acute distress.  Lungs: Normal effort, CTAB, no wheezing / rhonchi / rales.  CV: RRR, normal S1 and S2, no murmurs.      Labs:   Hospital Outpatient Visit on 09/13/2024   Component Date Value Ref Range Status    Vitamin B12 -True Cobalamin 09/13/2024 547  211 - 911 pg/mL " Final    Iron 09/13/2024 104  50 - 180 ug/dL Final    Total Iron Binding 09/13/2024 287  250 - 450 ug/dL Final    Unsat Iron Binding 09/13/2024 183  110 - 370 ug/dL Final    % Saturation 09/13/2024 36  15 - 55 % Final    Ferritin 09/13/2024 1621.0 (H)  22.0 - 322.0 ng/mL Final    Glycohemoglobin 09/13/2024 7.2 (H)  4.0 - 5.6 % Final    Comment: Increased risk for diabetes:  5.7 -6.4%  Diabetes:  >6.4%  Glycemic control for adults with diabetes:  <7.0%    The above interpretations are per ADA guidelines.  Diagnosis  of diabetes mellitus on the basis of elevated Hemoglobin A1c  should be confirmed by repeating the Hb A1c test.      Est Avg Glucose 09/13/2024 160  mg/dL Final    Comment: The eAG calculation is based on the A1c-Derived Daily Glucose  (ADAG) study.  See the ADA's website for additional information.      Alkaline Phosphatase 09/13/2024 75  30 - 99 U/L Final    AST(SGOT) 09/13/2024 64 (H)  12 - 45 U/L Final    ALT(SGPT) 09/13/2024 48  2 - 50 U/L Final    Total Bilirubin 09/13/2024 0.5  0.1 - 1.5 mg/dL Final    Direct Bilirubin 09/13/2024 <0.2  0.1 - 0.5 mg/dL Final    Indirect Bilirubin 09/13/2024 see below  0.0 - 1.0 mg/dL Final    Comment: Unable to calculate indirect bilirubin due to a total or direct  bilirubin result being outside the measurement range of the analyzer.      Albumin 09/13/2024 4.0  3.2 - 4.9 g/dL Final    Total Protein 09/13/2024 6.9  6.0 - 8.2 g/dL Final         Assessment & Plan:     48 y.o. male with the following -     1. Primary hypertension  Chronic, uncontrolled.  /98 today and repeat /90.  BP has been elevated at the past few visits but patient declined restarting lisinopril.  Risks associated with elevated BP were discussed with the patient today.  Patient was advised to get BP machine and check BP at home.  Bring BP machine and log to the next visit.    2. Uncontrolled type 2 diabetes mellitus with hyperglycemia (HCC)  Chronic, uncontrolled.  His A1c levels have  improved from 8.6 to 7.2.  He was taking all his medications every other day and was advised to take metformin ER 1000 mg daily and glipizide 10 mg twice daily.  Patient had allergic reaction to Farxiga.  Trulicity, Jardiance, Januvia were too costly.  Patient will be getting new insurance in January and consider starting additional medication if A1c > 7.  UTD with monofilament foot exam.  Patient will schedule retinal screening.    Cholesterol levels are satisfactory, but triglycerides are high, likely due to alcohol consumption. Blood sugar levels are slightly elevated. He experienced side effects with Farxiga and is currently on metformin and glipizide, which he takes every other day. He was advised to take his medications daily instead of every other day. A1c and liver function tests will be repeated in 3 months.   - HEMOGLOBIN A1C; Future    3. Heavy alcohol use  4. Elevated liver enzymes  5. Elevated ferritin  Chronic, uncontrolled.  Patient is drinking 3-4 beers nightly to help with sleep.  ALT has been trending upward and is 64.  Ferritin 1621.  Repeat LFTs ordered as well as RUQ ultrasound.  Risks associated with excessive alcohol consumption were discussed with the patient today and he was advised to cut down.  Patient declined referral to psychiatry for CBT to help with insomnia.  - US-RUQ; Future  - HEPATIC FUNCTION PANEL; Future    6. Anemia, unspecified type  Chronic.  Iron studies and B12 WNL with elevated ferritin.  UTD with colon cancer screening.  Possibly AOCD.    7. Dyslipidemia  8. Hypertriglyceridemia  Chronic, uncontrolled.   with normal TC, HDL, LDL.  Triglycerides are high, likely due to alcohol consumption.  Patient was taking all medications every other day and was advised to take fenofibrate 145 mg daily and atorvastatin 20 mg daily.          I spent a total of 35 minutes with record review, exam, communication with the patient, communication with other providers, and documentation  of this encounter.      No follow-ups on file.    Verbal consent was acquired by the patient to use [employer] ambient listening note generation during this visit: Yes.    Please note that this dictation was created using voice recognition software. I have made every reasonable attempt to correct obvious errors, but I expect that there are errors of grammar and possibly content that I did not discover before finalizing the note.

## 2025-02-12 ENCOUNTER — HOSPITAL ENCOUNTER (OUTPATIENT)
Dept: LAB | Facility: MEDICAL CENTER | Age: 50
End: 2025-02-12
Attending: STUDENT IN AN ORGANIZED HEALTH CARE EDUCATION/TRAINING PROGRAM
Payer: COMMERCIAL

## 2025-02-12 ENCOUNTER — HOSPITAL ENCOUNTER (OUTPATIENT)
Dept: RADIOLOGY | Facility: MEDICAL CENTER | Age: 50
End: 2025-02-12
Attending: STUDENT IN AN ORGANIZED HEALTH CARE EDUCATION/TRAINING PROGRAM
Payer: COMMERCIAL

## 2025-02-12 DIAGNOSIS — R74.8 ELEVATED LIVER ENZYMES: ICD-10-CM

## 2025-02-12 DIAGNOSIS — F10.90 HEAVY ALCOHOL USE: ICD-10-CM

## 2025-02-12 DIAGNOSIS — E11.65 UNCONTROLLED TYPE 2 DIABETES MELLITUS WITH HYPERGLYCEMIA (HCC): ICD-10-CM

## 2025-02-12 LAB
EST. AVERAGE GLUCOSE BLD GHB EST-MCNC: 140 MG/DL
HBA1C MFR BLD: 6.5 % (ref 4–5.6)

## 2025-02-12 PROCEDURE — 80076 HEPATIC FUNCTION PANEL: CPT

## 2025-02-12 PROCEDURE — 36415 COLL VENOUS BLD VENIPUNCTURE: CPT

## 2025-02-12 PROCEDURE — 83036 HEMOGLOBIN GLYCOSYLATED A1C: CPT

## 2025-02-12 PROCEDURE — 76705 ECHO EXAM OF ABDOMEN: CPT

## 2025-02-13 LAB
ALBUMIN SERPL BCP-MCNC: 4.2 G/DL (ref 3.2–4.9)
ALP SERPL-CCNC: 55 U/L (ref 30–99)
ALT SERPL-CCNC: 56 U/L (ref 2–50)
AST SERPL-CCNC: 62 U/L (ref 12–45)
BILIRUB CONJ SERPL-MCNC: 0.2 MG/DL (ref 0.1–0.5)
BILIRUB INDIRECT SERPL-MCNC: 0.3 MG/DL (ref 0–1)
BILIRUB SERPL-MCNC: 0.5 MG/DL (ref 0.1–1.5)
PROT SERPL-MCNC: 7.3 G/DL (ref 6–8.2)

## 2025-02-27 ENCOUNTER — RESULTS FOLLOW-UP (OUTPATIENT)
Dept: MEDICAL GROUP | Facility: PHYSICIAN GROUP | Age: 50
End: 2025-02-27

## 2025-02-28 ENCOUNTER — OFFICE VISIT (OUTPATIENT)
Dept: MEDICAL GROUP | Facility: PHYSICIAN GROUP | Age: 50
End: 2025-02-28
Payer: COMMERCIAL

## 2025-02-28 VITALS
OXYGEN SATURATION: 99 % | WEIGHT: 148.81 LBS | HEART RATE: 104 BPM | HEIGHT: 68 IN | DIASTOLIC BLOOD PRESSURE: 98 MMHG | SYSTOLIC BLOOD PRESSURE: 172 MMHG | BODY MASS INDEX: 22.55 KG/M2 | TEMPERATURE: 99.1 F

## 2025-02-28 DIAGNOSIS — K82.4 GALLBLADDER POLYP: ICD-10-CM

## 2025-02-28 DIAGNOSIS — E11.9 CONTROLLED TYPE 2 DIABETES MELLITUS WITHOUT COMPLICATION, WITHOUT LONG-TERM CURRENT USE OF INSULIN (HCC): Chronic | ICD-10-CM

## 2025-02-28 DIAGNOSIS — I10 PRIMARY HYPERTENSION: ICD-10-CM

## 2025-02-28 DIAGNOSIS — F10.90 ALCOHOL USE DISORDER: ICD-10-CM

## 2025-02-28 DIAGNOSIS — G47.00 INSOMNIA, UNSPECIFIED TYPE: Chronic | ICD-10-CM

## 2025-02-28 DIAGNOSIS — Z23 NEED FOR VACCINATION: ICD-10-CM

## 2025-02-28 DIAGNOSIS — R74.8 ELEVATED LIVER ENZYMES: ICD-10-CM

## 2025-02-28 PROBLEM — E78.5 DYSLIPIDEMIA: Chronic | Status: ACTIVE | Noted: 2022-12-21

## 2025-02-28 RX ORDER — HYDROXYZINE HYDROCHLORIDE 50 MG/1
50-100 TABLET, FILM COATED ORAL 3 TIMES DAILY PRN
Qty: 90 TABLET | Refills: 0 | Status: SHIPPED | OUTPATIENT
Start: 2025-02-28

## 2025-02-28 RX ORDER — LISINOPRIL 20 MG/1
20 TABLET ORAL DAILY
Qty: 90 TABLET | Refills: 3 | Status: SHIPPED | OUTPATIENT
Start: 2025-02-28 | End: 2026-04-04

## 2025-02-28 ASSESSMENT — ANXIETY QUESTIONNAIRES
5. BEING SO RESTLESS THAT IT IS HARD TO SIT STILL: NOT AT ALL
GAD7 TOTAL SCORE: 3
6. BECOMING EASILY ANNOYED OR IRRITABLE: NOT AT ALL
7. FEELING AFRAID AS IF SOMETHING AWFUL MIGHT HAPPEN: SEVERAL DAYS
2. NOT BEING ABLE TO STOP OR CONTROL WORRYING: NOT AT ALL
3. WORRYING TOO MUCH ABOUT DIFFERENT THINGS: SEVERAL DAYS
4. TROUBLE RELAXING: SEVERAL DAYS
1. FEELING NERVOUS, ANXIOUS, OR ON EDGE: NOT AT ALL

## 2025-02-28 ASSESSMENT — FIBROSIS 4 INDEX: FIB4 SCORE: 2.24

## 2025-02-28 ASSESSMENT — PATIENT HEALTH QUESTIONNAIRE - PHQ9
SUM OF ALL RESPONSES TO PHQ QUESTIONS 1-9: 6
5. POOR APPETITE OR OVEREATING: 0 - NOT AT ALL
CLINICAL INTERPRETATION OF PHQ2 SCORE: 2

## 2025-02-28 NOTE — PROGRESS NOTES
Subjective:     Chief Complaint   Patient presents with    Follow-Up     imaging    Lab Results         History of Present Illness  The patient presents for evaluation of diabetes, elevated liver enzymes, hypertension, and sleep issues.    A1c 6.5. He has been adhering to his prescribed medication regimen of metformin ER 1000 mg daily and glipizide 10 mg twice daily for diabetes. He has not yet scheduled his ophthalmological examination but has recently received an email reminder for a follow-up appointment.    He is making efforts to reduce his alcohol consumption. However, he reports difficulty sleeping after abstaining from alcohol for 2 to 3 days. He has a longstanding history of sleep disturbances and has previously tried Ambien, trazodone, ZzzQuil, and Unisom without success. He was also recently prescribed hydroxyzine but doesn't recall taking the medication. He has declined a referral to psychiatry. He acknowledges issues with his wife's family, who are currently residing with them, may be contributing to his sleep problems. He reports difficulty maintaining sleep rather than initiating sleep.        2/28/2025    11:20 AM 6/14/2024     8:00 AM 1/12/2023     4:00 PM 12/21/2022     1:00 PM   PHQ-9 Screening   Little interest or pleasure in doing things 1 - several days 0 - not at all 0 - not at all 0 - not at all   Feeling down, depressed, or hopeless 1 - several days 0 - not at all 0 - not at all 0 - not at all   Trouble falling or staying asleep, or sleeping too much 3 - nearly every day      Feeling tired or having little energy 1 - several days      Poor appetite or overeating 0 - not at all      Feeling bad about yourself - or that you are a failure or have let yourself or your family down 0 - not at all      Trouble concentrating on things, such as reading the newspaper or watching television 0 - not at all      Moving or speaking so slowly that other people could have noticed. Or the opposite - being so  "fidgety or restless that you have been moving around a lot more than usual 0 - not at all      Thoughts that you would be better off dead, or of hurting yourself in some way 0 - not at all      PHQ-2 Total Score 2 0 0 0   PHQ-9 Total Score 6        Interpretation of PHQ-9 Total Score   Score Severity   1-4 No Depression   5-9 Mild Depression   10-14 Moderate Depression   15-19 Moderately Severe Depression   20-27 Severe Depression        2/28/2025    11:54 AM    LUCIA-7 ANXIETY SCALE FLOWSHEET   Feeling nervous, anxious, or on edge 0   Not being able to stop or control worrying 0   Worrying too much about different things 1   Trouble relaxing 1   Being so restless that it is hard to sit still 0   Becoming easily annoyed or irritable 0   Feeling afraid as if something awful might happen 1   LUCIA-7 Total Score 3     Interpretation of LUCIA-7 Total Score   Score Severity   0-4 Minimal Anxiety  5-9 Mild Anxiety   10-14 Moderate Anxiety  15-21 Severy Anxiety    /98 and repeat same. He is agreeable to restart lisinopril for his high blood pressure.    SOCIAL HISTORY  The patient admits to drinking a few beers every night and is trying to cut down.       Health Maintenance: Completed    ROS:  Negative except as stated above.      Objective:     Exam:  BP (!) 172/98 (BP Location: Right arm, Patient Position: Sitting, BP Cuff Size: Adult)   Pulse (!) 104   Temp 37.3 °C (99.1 °F) (Temporal)   Ht 1.715 m (5' 7.5\")   Wt 67.5 kg (148 lb 13 oz)   SpO2 99%   BMI 22.96 kg/m²  Body mass index is 22.96 kg/m².    Physical Exam    Gen: Alert and oriented, no acute distress.  Lungs: Normal effort, CTAB, no wheezing / rhonchi / rales.  CV: RRR, normal S1 and S2, no murmurs.      Labs:   Hospital Outpatient Visit on 02/12/2025   Component Date Value Ref Range Status    Alkaline Phosphatase 02/12/2025 55  30 - 99 U/L Final    AST(SGOT) 02/12/2025 62 (H)  12 - 45 U/L Final    ALT(SGPT) 02/12/2025 56 (H)  2 - 50 U/L Final    Total " Bilirubin 02/12/2025 0.5  0.1 - 1.5 mg/dL Final    Direct Bilirubin 02/12/2025 0.2  0.1 - 0.5 mg/dL Final    Indirect Bilirubin 02/12/2025 0.3  0.0 - 1.0 mg/dL Final    Albumin 02/12/2025 4.2  3.2 - 4.9 g/dL Final    Total Protein 02/12/2025 7.3  6.0 - 8.2 g/dL Final    Glycohemoglobin 02/12/2025 6.5 (H)  4.0 - 5.6 % Final    Comment: Increased risk for diabetes:  5.7 -6.4%  Diabetes:  >6.4%  Glycemic control for adults with diabetes:  <7.0%    The above interpretations are per ADA guidelines.  Diagnosis  of diabetes mellitus on the basis of elevated Hemoglobin A1c  should be confirmed by repeating the Hb A1c test.      Est Avg Glucose 02/12/2025 140  mg/dL Final    Comment: The eAG calculation is based on the A1c-Derived Daily Glucose  (ADAG) study.  See the ADA's website for additional information.           Assessment & Plan:     49 y.o. male with the following -     1. Controlled type 2 diabetes mellitus without complication, without long-term current use of insulin (HCC)  Chronic, controlled.  A1c 6.5.  Continue metformin ER 1000 mg daily and glipizide 10 mg twice daily.  He will schedule annual eye exam.    2. Primary hypertension  Chronic, uncontrolled.  BP has been elevated at several visits including today and he's agreeable to restart lisinopril.  /98 today and prescribed lisinopril 20 mg daily.  - lisinopril (PRINIVIL) 20 MG Tab; Take 1 Tablet by mouth every day.  Dispense: 90 Tablet; Refill: 3    3. Insomnia, unspecified type  Chronic, uncontrolled.  He previously tried Ambien, trazodone, ZzzQuil, and Unisom.  He has been managing this condition with alcohol.  He doesn't recall trying hydroxyzine and was prescribed  mg at bedtime as needed today.  PHQ-9 score 6 and LUCIA-7 score 3.  He declined referral to psychiatry.  - hydrOXYzine HCl (ATARAX) 50 MG Tab; Take 1-2 Tablets by mouth 3 times a day as needed for Itching.  Dispense: 90 Tablet; Refill: 0    4. Gallbladder polyp  5. Elevated liver  enzymes  Chronic, uncontrolled.  AST 62 and ALT 56.  RUQ US showed hepatomegaly and steatosis, 2 gallbladder polyps measuring 4.7 mm and 3.3 mm, and 4.7 mm gallbladder stone.  Advised to cut down on alcohol use and given referral to GI for further management.  - Referral to Gastroenterology    6. Alcohol use disorder  Chronic, uncontrolled.  Risks associated with alcohol use were discussed today and he was encouraged to cut down.    7. Need for vaccination  - Pneumococcal Conjugate Vaccine 20-Valent (6 wks+)  - INFLUENZA VACCINE TRI INJ (PF)  - Hepatitis B Vaccine Adult IM        I spent a total of 40 minutes with record review, exam, communication with the patient, communication with other providers, and documentation of this encounter.      Return in about 1 month (around 3/28/2025) for BP follow-up.    Verbal consent was acquired by the patient to use [employer] ambient listening note generation during this visit: Yes.    Please note that this dictation was created using voice recognition software. I have made every reasonable attempt to correct obvious errors, but I expect that there are errors of grammar and possibly content that I did not discover before finalizing the note.

## 2025-03-05 NOTE — Clinical Note
REFERRAL APPROVAL NOTICE         Sent on March 5, 2025                   Yoni Jose  [address] [address]                   Dear Mr. Jose,    After a careful review of the medical information and benefit coverage, [facility] has processed your referral. See below for additional details.    If applicable, you must be actively enrolled with your insurance for coverage of the authorized service. If you have any questions regarding your coverage, please contact your insurance directly.    REFERRAL INFORMATION   Referral #:  [id number]  Referred-To Service Location    Referred-By Provider:  Gastroenterology    Shahana Nuñez M.D.   [facility]      [address] [address]  [phone] [address] [place] [address]  [phone]    Referral Start Date:  02/28/2025  Referral End Date:   02/28/2026             SCHEDULING  If you do not already have an appointment, please call [phone] to make an appointment.     MORE INFORMATION  If you do not already have a [employer] account, sign up at: [email or web address]  You can access your medical information, make appointments, see lab results, billing information, and more.  If you have questions regarding this referral, please contact  the [facility] Referrals department at:             [phone]. Monday - Friday 8:00AM - 5:00PM.     Sincerely,    [facility]